# Patient Record
Sex: MALE | Race: WHITE | URBAN - METROPOLITAN AREA
[De-identification: names, ages, dates, MRNs, and addresses within clinical notes are randomized per-mention and may not be internally consistent; named-entity substitution may affect disease eponyms.]

---

## 2017-01-12 ENCOUNTER — OFFICE VISIT (OUTPATIENT)
Dept: FAMILY MEDICINE CLINIC | Age: 22
End: 2017-01-12

## 2017-01-12 VITALS
WEIGHT: 139 LBS | TEMPERATURE: 97.8 F | DIASTOLIC BLOOD PRESSURE: 78 MMHG | SYSTOLIC BLOOD PRESSURE: 128 MMHG | OXYGEN SATURATION: 97 % | HEART RATE: 89 BPM | HEIGHT: 66 IN | BODY MASS INDEX: 22.34 KG/M2 | RESPIRATION RATE: 12 BRPM

## 2017-01-12 DIAGNOSIS — R41.840 CONCENTRATION DEFICIT: Primary | ICD-10-CM

## 2017-01-12 RX ORDER — METHYLPHENIDATE HYDROCHLORIDE 36 MG/1
TABLET, EXTENDED RELEASE ORAL
Qty: 21 TAB | Refills: 0 | Status: SHIPPED | OUTPATIENT
Start: 2017-01-12 | End: 2017-02-02 | Stop reason: DRUGHIGH

## 2017-01-12 NOTE — PROGRESS NOTES
Chief Complaint   Patient presents with    Follow-up    Medication Evaluation     he is a 19y.o. year old male who presents for evalution. Pt switched to Adderall XR from Vyvanse at last visit. States doesn't feel like Adderall helps at all. Did seem like was slightly helpful the first 2 weeks, then felt like wasn't helpful at all. Pt had similar issues with Vyvanse previously. Pt works and goes to school, states days begin around [employer] and last until 9pm.    Reviewed PmHx, RxHx, FmHx, SocHx, AllgHx and updated and dated in the chart. Review of Systems - negative except as listed above in the HPI    Objective:     Vitals:    01/12/17 1613   BP: 128/78   Pulse: 89   Resp: 12   Temp: 97.8 °F (36.6 °C)   SpO2: 97%   Weight: 139 lb (63 kg)   Height: 5' 6\" (1.676 m)     Physical Examination: General appearance - alert, well appearing, and in no distress  Mental status - alert, oriented to person, place, and time, normal mood, behavior, speech, dress, motor activity, and thought processes  Neurological - alert, oriented, normal speech, no focal findings or movement disorder noted, motor and sensory grossly normal bilaterally    Assessment/ Plan:   Nina Norman was seen today for follow-up and medication evaluation. Diagnoses and all orders for this visit:    Concentration deficit  -     REFERRAL TO PSYCHOLOGY  -     CONCERTA 36 mg CR tablet; Take 1 po every day  New rx. Referred to psychology for ADD eval, wondering if may be some component of learning disability or other issue since not really responding well to ADD medication. F/U 3 wks. Pt voiced understanding regarding plan of care. Follow-up Disposition:  Return if symptoms worsen or fail to improve. I have discussed the diagnosis with the patient and the intended plan as seen in the above orders. The patient has received an after-visit summary and questions were answered concerning future plans.      Medication Side Effects and Warnings were discussed with patient    Emeka Vargas NP

## 2017-01-12 NOTE — PATIENT INSTRUCTIONS
Attention Deficit Hyperactivity Disorder (ADHD) in Adults: Care Instructions  Your Care Instructions  Attention deficit hyperactivity disorder, or ADHD, is a condition that makes it hard to pay attention. So you may have problems when you try to focus, get organized, and finish tasks. It might make you more active than other people. Or you might do things without thinking first.  ADHD is very common. It usually starts in early childhood. Many adults don't realize they have it until their children are diagnosed. Then they become aware of their own symptoms. Doctors don't know what causes ADHD. But it often runs in families. ADHD can be treated with medicines, behavior training, and counseling. Treatment can improve your life. Follow-up care is a key part of your treatment and safety. Be sure to make and go to all appointments, and call your doctor if you are having problems. It's also a good idea to know your test results and keep a list of the medicines you take. How can you care for yourself at home? · Learn all you can about ADHD. This will help you and your family understand it better. · Take your medicines exactly as prescribed. Call your doctor if you think you are having a problem with your medicine. You will get more details on the specific medicines your doctor prescribes. · If you miss a dose of your medicine, do not take an extra dose. · If your doctor suggests counseling, find a counselor you like and trust. Talk openly and honestly. Be willing to make some changes. · Find a support group for adults with ADHD. Talking to others with the same problems can help you feel better. It can also give you ideas about how to best cope with the condition. · Get rid of distractions at your work space. Keep your desk clean. Try not to face a window or busy hallway. · Use files, planners, and other tools to keep you organized. · Limit use of alcohol, and do not use illegal drugs.  People with ADHD tend to become addicted more easily than others. Tell your doctor if you need help to quit. Counseling, support groups, and sometimes medicines can help you stay free of alcohol or drugs. · Get at least 30 minutes of physical activity on most days of the week. Exercise has been shown to help people cope with ADHD. Walking is a good choice. You also may want to do other activities, such as running, swimming, cycling, or playing tennis or team sports. When should you call for help? Watch closely for changes in your health, and be sure to contact your doctor if:  · You feel sad a lot or cry all the time. · You have trouble sleeping, or you sleep too much. · You find it hard to concentrate, make decisions, or remember things. · You change how you normally eat. · You feel guilty for no reason. Where can you learn more? Go to http://mekhi-kady.info/. Enter B196 in the search box to learn more about \"Attention Deficit Hyperactivity Disorder (ADHD) in Adults: Care Instructions. \"  Current as of: July 26, 2016  Content Version: 11.1  © 6260-6183 WorkshopLive, Incorporated. Care instructions adapted under license by HealthyTweet (which disclaims liability or warranty for this information). If you have questions about a medical condition or this instruction, always ask your healthcare professional. [address] any warranty or liability for your use of this information.

## 2017-01-12 NOTE — MR AVS SNAPSHOT
Visit Information Date & Time Provider Department Dept. Phone Encounter #  
 1/12/2017  4:05 PM Jass Gamez NP [address] [facility] [phone] [id number] Follow-up Instructions Return if symptoms worsen or fail to improve. Upcoming Health Maintenance Date Due DTaP/Tdap/Td series (2 - Td) 12/12/2026 Allergies as of 1/12/2017  Review Complete On: 1/12/2017 By: Jass Gamez NP No Known Allergies Current Immunizations  Reviewed on 7/13/2016 No immunizations on file. Not reviewed this visit You Were Diagnosed With   
  
 Codes Comments Concentration deficit    -  Primary ICD-10-CM: R41.840 ICD-9-CM: 799.51 Vitals BP Pulse Temp Resp Height(growth percentile) Weight(growth percentile) 128/78 89 97.8 °F (36.6 °C) 12 5' 6\" (1.676 m) 139 lb (63 kg) SpO2 BMI Smoking Status 97% 22.44 kg/m2 Never Smoker Vitals History BMI and BSA Data Body Mass Index Body Surface Area  
 22.44 kg/m 2 1.71 m 2 Preferred Pharmacy Pharmacy Name Phone [address] [facility] [phone] Your Updated Medication List  
  
   
This list is accurate as of: 1/12/17  4:25 PM.  Always use your most recent med list.  
  
  
  
  
 CONCERTA 36 mg CR tablet Generic drug:  methylphenidate Take 1 po every day Prescriptions Printed Refills CONCERTA 36 mg CR tablet 0 Sig: Take 1 po every day Class: Print We Performed the Following REFERRAL TO PSYCHOLOGY [EFZ91 Custom] Comments:  
 Please evaluate patient for concentration deficit - not responding well to medications Follow-up Instructions Return if symptoms worsen or fail to improve. Referral Information Referral ID Referred By Referred To  
  
 [id number] Frankey Kinds E Not Available Visits Status Start Date End Date 1 New Request 1/12/17 1/12/18 If your referral has a status of pending review or denied, additional information will be sent to support the outcome of this decision. Patient Instructions Attention Deficit Hyperactivity Disorder (ADHD) in Adults: Care Instructions Your Care Instructions Attention deficit hyperactivity disorder, or ADHD, is a condition that makes it hard to pay attention. So you may have problems when you try to focus, get organized, and finish tasks. It might make you more active than other people. Or you might do things without thinking first. 
ADHD is very common. It usually starts in early childhood. Many adults don't realize they have it until their children are diagnosed. Then they become aware of their own symptoms. Doctors don't know what causes ADHD. But it often runs in families. ADHD can be treated with medicines, behavior training, and counseling. Treatment can improve your life. Follow-up care is a key part of your treatment and safety. Be sure to make and go to all appointments, and call your doctor if you are having problems. It's also a good idea to know your test results and keep a list of the medicines you take. How can you care for yourself at home? · Learn all you can about ADHD. This will help you and your family understand it better. · Take your medicines exactly as prescribed. Call your doctor if you think you are having a problem with your medicine. You will get more details on the specific medicines your doctor prescribes. · If you miss a dose of your medicine, do not take an extra dose. · If your doctor suggests counseling, find a counselor you like and trust. Talk openly and honestly. Be willing to make some changes. · Find a support group for adults with ADHD. Talking to others with the same problems can help you feel better. It can also give you ideas about how to best cope with the condition. · Get rid of distractions at your work space. Keep your desk clean. Try not to face a window or busy hallway. · Use files, planners, and other tools to keep you organized. · Limit use of alcohol, and do not use illegal drugs. People with ADHD tend to become addicted more easily than others. Tell your doctor if you need help to quit. Counseling, support groups, and sometimes medicines can help you stay free of alcohol or drugs. · Get at least 30 minutes of physical activity on most days of the week. Exercise has been shown to help people cope with ADHD. Walking is a good choice. You also may want to do other activities, such as running, swimming, cycling, or playing tennis or team sports. When should you call for help? Watch closely for changes in your health, and be sure to contact your doctor if: 
· You feel sad a lot or cry all the time. · You have trouble sleeping, or you sleep too much. · You find it hard to concentrate, make decisions, or remember things. · You change how you normally eat. · You feel guilty for no reason. Where can you learn more? Go to http://mekhi-kady.info/. Enter B196 in the search box to learn more about \"Attention Deficit Hyperactivity Disorder (ADHD) in Adults: Care Instructions. \" Current as of: July 26, 2016 Content Version: 11.1 © 0298-0185 StreamBase Systems, Incorporated. Care instructions adapted under license by SmartNews (which disclaims liability or warranty for this information). If you have questions about a medical condition or this instruction, always ask your healthcare professional. [place] [address] any warranty or liability for your use of this information. Introducing [facility] & HEALTH SERVICES! Dear Tye Beasley: Thank you for requesting a Aushon BioSystems account. Our records indicate that you already have an active [employer] account. You can access your account anytime at [email or web address] Did you know that you can access your hospital and ER discharge instructions at any time in Isowalk? You can also review all of your test results from your hospital stay or ER visit. Additional Information If you have questions, please visit the Frequently Asked Questions section of the Isowalk website at [email or web address] Remember, Isowalk is NOT to be used for urgent needs. For medical emergencies, dial 911. Now available from your iPhone and Android! Please provide this summary of care documentation to your next provider. Your primary care clinician is listed as GURPREET LANGE. If you have any questions after today's visit, please call [phone].

## 2017-02-02 ENCOUNTER — OFFICE VISIT (OUTPATIENT)
Dept: FAMILY MEDICINE CLINIC | Age: 22
End: 2017-02-02

## 2017-02-02 VITALS
OXYGEN SATURATION: 99 % | BODY MASS INDEX: 21.86 KG/M2 | TEMPERATURE: 99.2 F | WEIGHT: 136 LBS | HEART RATE: 72 BPM | DIASTOLIC BLOOD PRESSURE: 77 MMHG | HEIGHT: 66 IN | RESPIRATION RATE: 12 BRPM | SYSTOLIC BLOOD PRESSURE: 130 MMHG

## 2017-02-02 DIAGNOSIS — F98.8 ADD (ATTENTION DEFICIT DISORDER): Primary | ICD-10-CM

## 2017-02-02 RX ORDER — METHYLPHENIDATE HYDROCHLORIDE 54 MG/1
TABLET ORAL
Qty: 14 TAB | Refills: 0 | Status: SHIPPED | OUTPATIENT
Start: 2017-02-02 | End: 2017-02-16 | Stop reason: SDUPTHER

## 2017-02-02 NOTE — PATIENT INSTRUCTIONS
Attention Deficit Hyperactivity Disorder (ADHD) in Adults: Care Instructions  Your Care Instructions  Attention deficit hyperactivity disorder, or ADHD, is a condition that makes it hard to pay attention. So you may have problems when you try to focus, get organized, and finish tasks. It might make you more active than other people. Or you might do things without thinking first.  ADHD is very common. It usually starts in early childhood. Many adults don't realize they have it until their children are diagnosed. Then they become aware of their own symptoms. Doctors don't know what causes ADHD. But it often runs in families. ADHD can be treated with medicines, behavior training, and counseling. Treatment can improve your life. Follow-up care is a key part of your treatment and safety. Be sure to make and go to all appointments, and call your doctor if you are having problems. It's also a good idea to know your test results and keep a list of the medicines you take. How can you care for yourself at home? · Learn all you can about ADHD. This will help you and your family understand it better. · Take your medicines exactly as prescribed. Call your doctor if you think you are having a problem with your medicine. You will get more details on the specific medicines your doctor prescribes. · If you miss a dose of your medicine, do not take an extra dose. · If your doctor suggests counseling, find a counselor you like and trust. Talk openly and honestly. Be willing to make some changes. · Find a support group for adults with ADHD. Talking to others with the same problems can help you feel better. It can also give you ideas about how to best cope with the condition. · Get rid of distractions at your work space. Keep your desk clean. Try not to face a window or busy hallway. · Use files, planners, and other tools to keep you organized. · Limit use of alcohol, and do not use illegal drugs.  People with ADHD tend to become addicted more easily than others. Tell your doctor if you need help to quit. Counseling, support groups, and sometimes medicines can help you stay free of alcohol or drugs. · Get at least 30 minutes of physical activity on most days of the week. Exercise has been shown to help people cope with ADHD. Walking is a good choice. You also may want to do other activities, such as running, swimming, cycling, or playing tennis or team sports. When should you call for help? Watch closely for changes in your health, and be sure to contact your doctor if:  · You feel sad a lot or cry all the time. · You have trouble sleeping, or you sleep too much. · You find it hard to concentrate, make decisions, or remember things. · You change how you normally eat. · You feel guilty for no reason. Where can you learn more? Go to http://mekhi-kady.info/. Enter B196 in the search box to learn more about \"Attention Deficit Hyperactivity Disorder (ADHD) in Adults: Care Instructions. \"  Current as of: July 26, 2016  Content Version: 11.1  © 8483-1782 Vidyo, Incorporated. Care instructions adapted under license by Canva (which disclaims liability or warranty for this information). If you have questions about a medical condition or this instruction, always ask your healthcare professional. [address] any warranty or liability for your use of this information.

## 2017-02-02 NOTE — PROGRESS NOTES
Chief Complaint   Patient presents with    Medication Evaluation     he is a 19y.o. year old male who presents for evalution. Pt states feels improvement on Concerta, much better than Adderall XR. Pt states most days seems to work well, some days isn't as effective. Pt states he feels good at current dose, wife states feels like medication could be increased or changed. Reviewed PmHx, RxHx, FmHx, SocHx, AllgHx and updated and dated in the chart. Review of Systems - negative except as listed above in the HPI    Objective:     Vitals:    02/02/17 1626   BP: 130/77   Pulse: 72   Resp: 12   Temp: 99.2 °F (37.3 °C)   SpO2: 99%   Weight: 136 lb (61.7 kg)   Height: 5' 6\" (1.676 m)     Physical Examination: General appearance - alert, well appearing, and in no distress  Mental status - alert, oriented to person, place, and time, normal mood, behavior, speech, dress, motor activity, and thought processes  Chest - clear to auscultation, no wheezes, rales or rhonchi, symmetric air entry  Heart - normal rate, regular rhythm, normal S1, S2, no murmurs, rubs, clicks or gallops    Assessment/ Plan:   Isaiah Fuller was seen today for medication evaluation. Diagnoses and all orders for this visit:    ADD (attention deficit disorder)  -     methylphenidate ER 54 mg 24 hr tab; Take 1 po qam  Increased dose, pt only wants 2 wk supply to try. F/U 2 wks for recheck. Reviewed , pt taking appropriately. Pt voiced understanding regarding plan of care. Follow-up Disposition:  Return in about 2 weeks (around 2/16/2017) for ADD eval .    I have discussed the diagnosis with the patient and the intended plan as seen in the above orders. The patient has received an after-visit summary and questions were answered concerning future plans.      Medication Side Effects and Warnings were discussed with patient    Sundar Peters NP

## 2017-02-02 NOTE — MR AVS SNAPSHOT
Visit Information Date & Time Provider Department Dept. Phone Encounter #  
 2/2/2017  4:05 PM Michelle Pressley NP [address] [facility] [phone] [id number] Follow-up Instructions Return in about 2 weeks (around 2/16/2017) for ADD eval . Upcoming Health Maintenance Date Due DTaP/Tdap/Td series (2 - Td) 12/12/2026 Allergies as of 2/2/2017  Review Complete On: 2/2/2017 By: Michelle Pressley NP No Known Allergies Current Immunizations  Reviewed on 7/13/2016 No immunizations on file. Not reviewed this visit You Were Diagnosed With   
  
 Codes Comments ADD (attention deficit disorder)    -  Primary ICD-10-CM: F98.8 ICD-9-CM: 314.00 Vitals BP Pulse Temp Resp Height(growth percentile) Weight(growth percentile) 130/77 72 99.2 °F (37.3 °C) 12 5' 6\" (1.676 m) 136 lb (61.7 kg) SpO2 BMI Smoking Status 99% 21.95 kg/m2 Never Smoker Vitals History BMI and BSA Data Body Mass Index Body Surface Area  
 21.95 kg/m 2 1.7 m 2 Preferred Pharmacy Pharmacy Name Phone [address] [facility] [phone] Your Updated Medication List  
  
   
This list is accurate as of: 2/2/17  4:35 PM.  Always use your most recent med list.  
  
  
  
  
 methylphenidate 54 mg CR tablet Commonly known as:  CONCERTA Take 1 po qam  
  
  
  
  
Prescriptions Printed Refills  
 methylphenidate ER 54 mg 24 hr tab 0 Sig: Take 1 po qam  
 Class: Print Follow-up Instructions Return in about 2 weeks (around 2/16/2017) for ADD eval . Patient Instructions Attention Deficit Hyperactivity Disorder (ADHD) in Adults: Care Instructions Your Care Instructions Attention deficit hyperactivity disorder, or ADHD, is a condition that makes it hard to pay attention.  So you may have problems when you try to focus, get organized, and finish tasks. It might make you more active than other people. Or you might do things without thinking first. 
ADHD is very common. It usually starts in early childhood. Many adults don't realize they have it until their children are diagnosed. Then they become aware of their own symptoms. Doctors don't know what causes ADHD. But it often runs in families. ADHD can be treated with medicines, behavior training, and counseling. Treatment can improve your life. Follow-up care is a key part of your treatment and safety. Be sure to make and go to all appointments, and call your doctor if you are having problems. It's also a good idea to know your test results and keep a list of the medicines you take. How can you care for yourself at home? · Learn all you can about ADHD. This will help you and your family understand it better. · Take your medicines exactly as prescribed. Call your doctor if you think you are having a problem with your medicine. You will get more details on the specific medicines your doctor prescribes. · If you miss a dose of your medicine, do not take an extra dose. · If your doctor suggests counseling, find a counselor you like and trust. Talk openly and honestly. Be willing to make some changes. · Find a support group for adults with ADHD. Talking to others with the same problems can help you feel better. It can also give you ideas about how to best cope with the condition. · Get rid of distractions at your work space. Keep your desk clean. Try not to face a window or busy hallway. · Use files, planners, and other tools to keep you organized. · Limit use of alcohol, and do not use illegal drugs. People with ADHD tend to become addicted more easily than others. Tell your doctor if you need help to quit. Counseling, support groups, and sometimes medicines can help you stay free of alcohol or drugs. · Get at least 30 minutes of physical activity on most days of the week. Exercise has been shown to help people cope with ADHD. Walking is a good choice. You also may want to do other activities, such as running, swimming, cycling, or playing tennis or team sports. When should you call for help? Watch closely for changes in your health, and be sure to contact your doctor if: 
· You feel sad a lot or cry all the time. · You have trouble sleeping, or you sleep too much. · You find it hard to concentrate, make decisions, or remember things. · You change how you normally eat. · You feel guilty for no reason. Where can you learn more? Go to http://mekhi-kady.info/. Enter B196 in the search box to learn more about \"Attention Deficit Hyperactivity Disorder (ADHD) in Adults: Care Instructions. \" Current as of: July 26, 2016 Content Version: 11.1 © 7711-4972 Studer Group. Care instructions adapted under license by Bon'App (which disclaims liability or warranty for this information). If you have questions about a medical condition or this instruction, always ask your healthcare professional. [place] [address] any warranty or liability for your use of this information. Introducing [facility] & HEALTH SERVICES! Dear Yann Poon: Thank you for requesting a SMITH (formerly Ascentium) account. Our records indicate that you already have an active SMITH (formerly Ascentium) account. You can access your account anytime at [email or web address] Shot [email or web address] Shop/Flint and Tinder Did you know that you can access your hospital and ER discharge instructions at any time in SMITH (formerly Ascentium)? You can also review all of your test results from your hospital stay or ER visit. Additional Information If you have questions, please visit the Frequently Asked Questions section of the SMITH (formerly Ascentium) website at [email or web address] Shot [email or web address] Shop/[email or web address] Tinder/. Remember, SMITH (formerly Ascent[employer]) is NOT to be used for urgent needs. For medical emergencies, dial 911. Now available from your iPhone and Android! Please provide this summary of care documentation to your next provider. Your primary care clinician is listed as GURPREET LANGE. If you have any questions after today's visit, please call [phone].

## 2017-02-16 ENCOUNTER — OFFICE VISIT (OUTPATIENT)
Dept: FAMILY MEDICINE CLINIC | Age: 22
End: 2017-02-16

## 2017-02-16 VITALS
HEIGHT: 66 IN | RESPIRATION RATE: 16 BRPM | BODY MASS INDEX: 22.1 KG/M2 | TEMPERATURE: 98.6 F | WEIGHT: 137.5 LBS | OXYGEN SATURATION: 96 % | DIASTOLIC BLOOD PRESSURE: 70 MMHG | SYSTOLIC BLOOD PRESSURE: 120 MMHG | HEART RATE: 76 BPM

## 2017-02-16 DIAGNOSIS — F98.8 ADD (ATTENTION DEFICIT DISORDER): Primary | ICD-10-CM

## 2017-02-16 RX ORDER — METHYLPHENIDATE HYDROCHLORIDE 54 MG/1
TABLET ORAL
Qty: 30 TAB | Refills: 0 | Status: SHIPPED | OUTPATIENT
Start: 2017-02-16 | End: 2017-03-24 | Stop reason: SDUPTHER

## 2017-02-16 NOTE — PROGRESS NOTES
1. Have you been to the ER, urgent care clinic since your last visit? Hospitalized since your last visit? No    2. Have you seen or consulted any other health care providers outside of the [address] [place] [address] since your last visit? Include any pap smears or colon screening.  No    Chief Complaint   Patient presents with    Attention Deficit Disorder     evaluation

## 2017-02-16 NOTE — PROGRESS NOTES
Chief Complaint   Patient presents with    Attention Deficit Disorder     evaluation     he is a 19y.o. year old male who presents for evalution. Pt states new dose of medication is working well. Feels well, like things are improving but isn't having the issue of it being dramatic increase and then stop working. Wife has also noticed improvement, still has some moments of occasional issues but mostly improving. Reviewed PmHx, RxHx, FmHx, SocHx, AllgHx and updated and dated in the chart. Review of Systems - negative except as listed above in the HPI    Objective:     Vitals:    02/16/17 1612   BP: 120/70   Pulse: 76   Resp: 16   Temp: 98.6 °F (37 °C)   TempSrc: Oral   SpO2: 96%   Weight: 137 lb 8 oz (62.4 kg)   Height: 5' 6\" (1.676 m)     Physical Examination: General appearance - alert, well appearing, and in no distress  Mental status - alert, oriented to person, place, and time, normal mood, behavior, speech, dress, motor activity, and thought processes  Neurological - alert, oriented, normal speech, no focal findings or movement disorder noted    Assessment/ Plan:   Edvin Lawson was seen today for attention deficit disorder. Diagnoses and all orders for this visit:    ADD (attention deficit disorder)  -     methylphenidate ER 54 mg 24 hr tab; Take 1 po qam  Reviewed , pt taking appropriately. Refill provided. F/U 1 mo and if feeling well then will give 3 mo supply. Pt voiced understanding regarding plan of care. Follow-up Disposition:  Return if symptoms worsen or fail to improve. I have discussed the diagnosis with the patient and the intended plan as seen in the above orders. The patient has received an after-visit summary and questions were answered concerning future plans.      Medication Side Effects and Warnings were discussed with patient    Juan Glover, MAXIMINO

## 2017-02-16 NOTE — MR AVS SNAPSHOT
Visit Information Date & Time Provider Department Dept. Phone Encounter #  
 2/16/2017  4:05 PM Rafa Sampson NP [address] [facility] [phone] [id number] Follow-up Instructions Return if symptoms worsen or fail to improve. Upcoming Health Maintenance Date Due DTaP/Tdap/Td series (2 - Td) 12/12/2026 Allergies as of 2/16/2017  Review Complete On: 2/16/2017 By: Rafa Sampson NP No Known Allergies Current Immunizations  Reviewed on 7/13/2016 No immunizations on file. Not reviewed this visit You Were Diagnosed With   
  
 Codes Comments ADD (attention deficit disorder)    -  Primary ICD-10-CM: F98.8 ICD-9-CM: 314.00 Vitals BP Pulse Temp Resp Height(growth percentile) Weight(growth percentile) 120/70 76 98.6 °F (37 °C) (Oral) 16 5' 6\" (1.676 m) 137 lb 8 oz (62.4 kg) SpO2 BMI Smoking Status 96% 22.19 kg/m2 Never Smoker Vitals History BMI and BSA Data Body Mass Index Body Surface Area  
 22.19 kg/m 2 1.7 m 2 Preferred Pharmacy Pharmacy Name Phone [address] [facility] OF  [facility] [phone] Your Updated Medication List  
  
   
This list is accurate as of: 2/16/17  4:25 PM.  Always use your most recent med list.  
  
  
  
  
 methylphenidate 54 mg CR tablet Commonly known as:  CONCERTA Take 1 po qam  
  
  
  
  
Prescriptions Printed Refills  
 methylphenidate ER 54 mg 24 hr tab 0 Sig: Take 1 po qam  
 Class: Print Follow-up Instructions Return if symptoms worsen or fail to improve. Patient Instructions Attention Deficit Hyperactivity Disorder (ADHD) in Adults: Care Instructions Your Care Instructions Attention deficit hyperactivity disorder, or ADHD, is a condition that makes it hard to pay attention.  So you may have problems when you try to focus, get organized, and finish tasks. It might make you more active than other people. Or you might do things without thinking first. 
ADHD is very common. It usually starts in early childhood. Many adults don't realize they have it until their children are diagnosed. Then they become aware of their own symptoms. Doctors don't know what causes ADHD. But it often runs in families. ADHD can be treated with medicines, behavior training, and counseling. Treatment can improve your life. Follow-up care is a key part of your treatment and safety. Be sure to make and go to all appointments, and call your doctor if you are having problems. It's also a good idea to know your test results and keep a list of the medicines you take. How can you care for yourself at home? · Learn all you can about ADHD. This will help you and your family understand it better. · Take your medicines exactly as prescribed. Call your doctor if you think you are having a problem with your medicine. You will get more details on the specific medicines your doctor prescribes. · If you miss a dose of your medicine, do not take an extra dose. · If your doctor suggests counseling, find a counselor you like and trust. Talk openly and honestly. Be willing to make some changes. · Find a support group for adults with ADHD. Talking to others with the same problems can help you feel better. It can also give you ideas about how to best cope with the condition. · Get rid of distractions at your work space. Keep your desk clean. Try not to face a window or busy hallway. · Use files, planners, and other tools to keep you organized. · Limit use of alcohol, and do not use illegal drugs. People with ADHD tend to become addicted more easily than others. Tell your doctor if you need help to quit. Counseling, support groups, and sometimes medicines can help you stay free of alcohol or drugs. · Get at least 30 minutes of physical activity on most days of the week. Exercise has been shown to help people cope with ADHD. Walking is a good choice. You also may want to do other activities, such as running, swimming, cycling, or playing tennis or team sports. When should you call for help? Watch closely for changes in your health, and be sure to contact your doctor if: 
· You feel sad a lot or cry all the time. · You have trouble sleeping, or you sleep too much. · You find it hard to concentrate, make decisions, or remember things. · You change how you normally eat. · You feel guilty for no reason. Where can you learn more? Go to http://mekhi-kady.info/. Enter B196 in the search box to learn more about \"Attention Deficit Hyperactivity Disorder (ADHD) in Adults: Care Instructions. \" Current as of: July 26, 2016 Content Version: 11.1 © 0260-5087 [employer]. Care instructions adapted under license by [employer] (which disclaims liability or warranty for this information). If you have questions about a medical condition or this instruction, always ask your healthcare professional. [place] [address] any warranty or liability for your use of this information. Introducing [facility] & HEALTH SERVICES! Dear Geeta Gutiérrez: Thank you for requesting a tadoÂ° account. Our records indicate that you already have an active tadoÂ° account. You can access your account anytime at [email or web address] Did you know that you can access your hospital and ER discharge instructions at any time in tadoÂ°? You can also review all of your test results from your hospital stay or ER visit. Additional Information If you have questions, please visit the Frequently Asked Questions section of the tadoÂ° website at [email or web address] Remember, tadoÂ° is NOT to be used for urgent needs. For medical emergencies, dial 911. Now available from your iPhone and Android! Please provide this summary of care documentation to your next provider. Your primary care clinician is listed as GURPREET LANGE. If you have any questions after today's visit, please call [phone].

## 2017-03-24 ENCOUNTER — OFFICE VISIT (OUTPATIENT)
Dept: FAMILY MEDICINE CLINIC | Age: 22
End: 2017-03-24

## 2017-03-24 VITALS
RESPIRATION RATE: 16 BRPM | HEART RATE: 75 BPM | DIASTOLIC BLOOD PRESSURE: 76 MMHG | TEMPERATURE: 98.1 F | WEIGHT: 142.6 LBS | HEIGHT: 66 IN | SYSTOLIC BLOOD PRESSURE: 124 MMHG | BODY MASS INDEX: 22.92 KG/M2 | OXYGEN SATURATION: 98 %

## 2017-03-24 DIAGNOSIS — F98.8 ADD (ATTENTION DEFICIT DISORDER): ICD-10-CM

## 2017-03-24 RX ORDER — METHYLPHENIDATE HYDROCHLORIDE 54 MG/1
54 TABLET ORAL DAILY
Qty: 30 TAB | Refills: 0 | Status: SHIPPED | OUTPATIENT
Start: 2017-04-23 | End: 2017-06-28 | Stop reason: SDUPTHER

## 2017-03-24 RX ORDER — METHYLPHENIDATE HYDROCHLORIDE 36 MG/1
TABLET, EXTENDED RELEASE ORAL
Refills: 0 | COMMUNITY
Start: 2017-01-12 | End: 2017-03-24

## 2017-03-24 RX ORDER — METHYLPHENIDATE HYDROCHLORIDE 54 MG/1
54 TABLET ORAL
Qty: 30 TAB | Refills: 0 | Status: SHIPPED | OUTPATIENT
Start: 2017-05-23 | End: 2017-06-28 | Stop reason: SDUPTHER

## 2017-03-24 RX ORDER — METHYLPHENIDATE HYDROCHLORIDE 54 MG/1
TABLET ORAL
Qty: 30 TAB | Refills: 0 | Status: SHIPPED | OUTPATIENT
Start: 2017-03-24 | End: 2017-06-28 | Stop reason: SDUPTHER

## 2017-03-24 NOTE — PROGRESS NOTES
Chief Complaint   Patient presents with    Medication Refill     Concerta     he is a 19y.o. year old male who presents for evalution. Here for ADD F/U. Doing well on current medication and dosage!!!! No problems with lack of appetite or insomnia. Reviewed PmHx, RxHx, FmHx, SocHx, AllgHx and updated and dated in the chart. Review of Systems - negative except as listed above in the HPI    Objective:     Vitals:    03/24/17 1314   BP: 124/76   Pulse: 75   Resp: 16   Temp: 98.1 °F (36.7 °C)   TempSrc: Oral   SpO2: 98%   Weight: 142 lb 9.6 oz (64.7 kg)   Height: 5' 6\" (1.676 m)     Physical Examination: General appearance - alert, well appearing, and in no distress  Mental status - alert, oriented to person, place, and time, normal mood, behavior, speech, dress, motor activity, and thought processes  Chest - clear to auscultation, no wheezes, rales or rhonchi, symmetric air entry  Heart - normal rate, regular rhythm, normal S1, S2, no murmurs, rubs, clicks or gallops    Assessment/ Plan:   Edvin Lawson was seen today for medication refill. Diagnoses and all orders for this visit:    ADD (attention deficit disorder)  -     methylphenidate ER 54 mg 24 hr tab; Take 1 po qam  -     methylphenidate ER 54 mg 24 hr tab; Take 1 Tab (54 mg total) by mouth every morningEarliest Fill Date: 5/23/17. Max Daily Amount: 54 mg  -     methylphenidate ER 54 mg 24 hr tab; Take 1 Tab (54 mg total) by mouth dailyEarliest Fill Date: 4/23/17. Max Daily Amount: 54 mg   Refills provided. Reviewed , pt taking appropriately. F/U 3mo. Pt voiced understanding regarding plan of care. Follow-up Disposition:  Return in about 3 months (around 6/24/2017) for ADD meds . I have discussed the diagnosis with the patient and the intended plan as seen in the above orders. The patient has received an after-visit summary and questions were answered concerning future plans.      Medication Side Effects and Warnings were discussed with patient    Rafa Sampson NP

## 2017-03-24 NOTE — PATIENT INSTRUCTIONS
Attention Deficit Hyperactivity Disorder (ADHD) in Adults: Care Instructions  Your Care Instructions  Attention deficit hyperactivity disorder, or ADHD, is a condition that makes it hard to pay attention. So you may have problems when you try to focus, get organized, and finish tasks. It might make you more active than other people. Or you might do things without thinking first.  ADHD is very common. It usually starts in early childhood. Many adults don't realize they have it until their children are diagnosed. Then they become aware of their own symptoms. Doctors don't know what causes ADHD. But it often runs in families. ADHD can be treated with medicines, behavior training, and counseling. Treatment can improve your life. Follow-up care is a key part of your treatment and safety. Be sure to make and go to all appointments, and call your doctor if you are having problems. It's also a good idea to know your test results and keep a list of the medicines you take. How can you care for yourself at home? · Learn all you can about ADHD. This will help you and your family understand it better. · Take your medicines exactly as prescribed. Call your doctor if you think you are having a problem with your medicine. You will get more details on the specific medicines your doctor prescribes. · If you miss a dose of your medicine, do not take an extra dose. · If your doctor suggests counseling, find a counselor you like and trust. Talk openly and honestly. Be willing to make some changes. · Find a support group for adults with ADHD. Talking to others with the same problems can help you feel better. It can also give you ideas about how to best cope with the condition. · Get rid of distractions at your work space. Keep your desk clean. Try not to face a window or busy hallway. · Use files, planners, and other tools to keep you organized. · Limit use of alcohol, and do not use illegal drugs.  People with ADHD tend to become addicted more easily than others. Tell your doctor if you need help to quit. Counseling, support groups, and sometimes medicines can help you stay free of alcohol or drugs. · Get at least 30 minutes of physical activity on most days of the week. Exercise has been shown to help people cope with ADHD. Walking is a good choice. You also may want to do other activities, such as running, swimming, cycling, or playing tennis or team sports. When should you call for help? Watch closely for changes in your health, and be sure to contact your doctor if:  · You feel sad a lot or cry all the time. · You have trouble sleeping, or you sleep too much. · You find it hard to concentrate, make decisions, or remember things. · You change how you normally eat. · You feel guilty for no reason. Where can you learn more? Go to http://mekhi-kady.info/. Enter B196 in the search box to learn more about \"Attention Deficit Hyperactivity Disorder (ADHD) in Adults: Care Instructions. \"  Current as of: July 26, 2016  Content Version: 11.1  © 9763-2526 [employer] [address], [employer]. Care instructions adapted under license by [facility] (which disclaims liability or warranty for this information). If you have questions about a medical condition or this instruction, always ask your healthcare professional. [address] any warranty or liability for your use of this information.

## 2017-03-24 NOTE — MR AVS SNAPSHOT
Visit Information Date & Time Provider Department Dept. Phone Encounter #  
 3/24/2017  1:15 PM Neo Duvalpe, NP [address] [facility] [phone] [id number] Follow-up Instructions Return in about 3 months (around 6/24/2017) for ADD meds . Upcoming Health Maintenance Date Due DTaP/Tdap/Td series (2 - Td) 12/12/2026 Allergies as of 3/24/2017  Review Complete On: 3/24/2017 By: Darien Orona No Known Allergies Current Immunizations  Reviewed on 7/13/2016 No immunizations on file. Not reviewed this visit You Were Diagnosed With   
  
 Codes Comments ADD (attention deficit disorder)     ICD-10-CM: F98.8 ICD-9-CM: 314.00 Vitals BP Pulse Temp Resp Height(growth percentile) Weight(growth percentile) 124/76 (BP 1 Location: Left arm, BP Patient Position: Sitting) 75 98.1 °F (36.7 °C) (Oral) 16 5' 6\" (1.676 m) 142 lb 9.6 oz (64.7 kg) SpO2 BMI Smoking Status 98% 23.02 kg/m2 Never Smoker Vitals History BMI and BSA Data Body Mass Index Body Surface Area 23.02 kg/m 2 1.74 m 2 Preferred Pharmacy Pharmacy Name Phone [address] [facility] [phone] Your Updated Medication List  
  
   
This list is accurate as of: 3/24/17  1:22 PM.  Always use your most recent med list.  
  
  
  
  
 * methylphenidate 54 mg CR tablet Commonly known as:  CONCERTA Take 1 po qam  
  
 * methylphenidate 54 mg CR tablet Commonly known as:  CONCERTA Take 1 Tab (54 mg total) by mouth dailyEarliest Fill Date: 4/23/17. Max Daily Amount: 54 mg Start taking on:  4/23/2017 * methylphenidate 54 mg CR tablet Commonly known as:  CONCERTA Take 1 Tab (54 mg total) by mouth every morningEarliest Fill Date: 5/23/17. Max Daily Amount: 54 mg Start taking on:  5/23/2017 * Notice: This list has 3 medication(s) that are the same as other medications prescribed for you. Read the directions carefully, and ask your doctor or other care provider to review them with you. Prescriptions Printed Refills  
 methylphenidate ER 54 mg 24 hr tab 0 Sig: Take 1 po qam  
 Class: Print  
 methylphenidate ER 54 mg 24 hr tab 0 Starting on: 5/23/2017 Sig: Take 1 Tab (54 mg total) by mouth every morningEarliest Fill Date: 5/23/17. Max Daily Amount: 54 mg Class: Print Route: Oral  
 methylphenidate ER 54 mg 24 hr tab 0 Starting on: 4/23/2017 Sig: Take 1 Tab (54 mg total) by mouth dailyEarliest Fill Date: 4/23/17. Max Daily Amount: 54 mg Class: Print Route: Oral  
  
Follow-up Instructions Return in about 3 months (around 6/24/2017) for ADD meds . Patient Instructions Attention Deficit Hyperactivity Disorder (ADHD) in Adults: Care Instructions Your Care Instructions Attention deficit hyperactivity disorder, or ADHD, is a condition that makes it hard to pay attention. So you may have problems when you try to focus, get organized, and finish tasks. It might make you more active than other people. Or you might do things without thinking first. 
ADHD is very common. It usually starts in early childhood. Many adults don't realize they have it until their children are diagnosed. Then they become aware of their own symptoms. Doctors don't know what causes ADHD. But it often runs in families. ADHD can be treated with medicines, behavior training, and counseling. Treatment can improve your life. Follow-up care is a key part of your treatment and safety. Be sure to make and go to all appointments, and call your doctor if you are having problems. It's also a good idea to know your test results and keep a list of the medicines you take. How can you care for yourself at home? · Learn all you can about ADHD.  This will help you and your family understand it better. · Take your medicines exactly as prescribed. Call your doctor if you think you are having a problem with your medicine. You will get more details on the specific medicines your doctor prescribes. · If you miss a dose of your medicine, do not take an extra dose. · If your doctor suggests counseling, find a counselor you like and trust. Talk openly and honestly. Be willing to make some changes. · Find a support group for adults with ADHD. Talking to others with the same problems can help you feel better. It can also give you ideas about how to best cope with the condition. · Get rid of distractions at your work space. Keep your desk clean. Try not to face a window or busy hallway. · Use files, planners, and other tools to keep you organized. · Limit use of alcohol, and do not use illegal drugs. People with ADHD tend to become addicted more easily than others. Tell your doctor if you need help to quit. Counseling, support groups, and sometimes medicines can help you stay free of alcohol or drugs. · Get at least 30 minutes of physical activity on most days of the week. Exercise has been shown to help people cope with ADHD. Walking is a good choice. You also may want to do other activities, such as running, swimming, cycling, or playing tennis or team sports. When should you call for help? Watch closely for changes in your health, and be sure to contact your doctor if: 
· You feel sad a lot or cry all the time. · You have trouble sleeping, or you sleep too much. · You find it hard to concentrate, make decisions, or remember things. · You change how you normally eat. · You feel guilty for no reason. Where can you learn more? Go to http://mekhi-kady.info/. Enter B196 in the search box to learn more about \"Attention Deficit Hyperactivity Disorder (ADHD) in Adults: Care Instructions. \" Current as of: July 26, 2016 Content Version: 11.1 © 7300-7225 Healthwise, Incorporated. Care instructions adapted under license by [employer] (which disclaims liability or warranty for this information). If you have questions about a medical condition or this instruction, always ask your healthcare professional. [address] any warranty or liability for your use of this information. Introducing [facility] & HEALTH SERVICES! Dear Tye Beasley: Thank you for requesting a [employer] account. Our records indicate that you already have an active [employer] account. You can access your account anytime at [email or web address] Did you know that you can access your hospital and ER discharge instructions at any time in [employer]? You can also review all of your test results from your hospital stay or ER visit. Additional Information If you have questions, please visit the Frequently Asked Questions section of the [employer] website at [email or web address] Remember, [employer] is NOT to be used for urgent needs. For medical emergencies, dial 911. Now available from your iPhone and Android! Please provide this summary of care documentation to your next provider. Your primary care clinician is listed as GURPREET LANGE. If you have any questions after today's visit, please call [phone].

## 2017-06-28 ENCOUNTER — OFFICE VISIT (OUTPATIENT)
Dept: FAMILY MEDICINE CLINIC | Age: 22
End: 2017-06-28

## 2017-06-28 VITALS
WEIGHT: 144 LBS | BODY MASS INDEX: 23.14 KG/M2 | RESPIRATION RATE: 16 BRPM | SYSTOLIC BLOOD PRESSURE: 112 MMHG | TEMPERATURE: 98.4 F | DIASTOLIC BLOOD PRESSURE: 76 MMHG | HEIGHT: 66 IN | OXYGEN SATURATION: 99 % | HEART RATE: 64 BPM

## 2017-06-28 DIAGNOSIS — F98.8 ADD (ATTENTION DEFICIT DISORDER): ICD-10-CM

## 2017-06-28 RX ORDER — METHYLPHENIDATE HYDROCHLORIDE 54 MG/1
54 TABLET ORAL
Qty: 30 TAB | Refills: 0 | Status: SHIPPED | OUTPATIENT
Start: 2017-07-28 | End: 2017-10-19 | Stop reason: SDUPTHER

## 2017-06-28 RX ORDER — METHYLPHENIDATE HYDROCHLORIDE 54 MG/1
54 TABLET ORAL DAILY
Qty: 30 TAB | Refills: 0 | Status: SHIPPED | OUTPATIENT
Start: 2017-06-28 | End: 2017-10-19 | Stop reason: SDUPTHER

## 2017-06-28 RX ORDER — METHYLPHENIDATE HYDROCHLORIDE 54 MG/1
TABLET ORAL
Qty: 30 TAB | Refills: 0 | Status: SHIPPED | OUTPATIENT
Start: 2017-08-27 | End: 2017-10-19 | Stop reason: SDUPTHER

## 2017-06-28 NOTE — PATIENT INSTRUCTIONS
Attention Deficit Hyperactivity Disorder (ADHD) in Adults: Care Instructions  Your Care Instructions  Attention deficit hyperactivity disorder, or ADHD, is a condition that makes it hard to pay attention. So you may have problems when you try to focus, get organized, and finish tasks. It might make you more active than other people. Or you might do things without thinking first.  ADHD is very common. It usually starts in early childhood. Many adults don't realize they have it until their children are diagnosed. Then they become aware of their own symptoms. Doctors don't know what causes ADHD. But it often runs in families. ADHD can be treated with medicines, behavior training, and counseling. Treatment can improve your life. Follow-up care is a key part of your treatment and safety. Be sure to make and go to all appointments, and call your doctor if you are having problems. It's also a good idea to know your test results and keep a list of the medicines you take. How can you care for yourself at home? · Learn all you can about ADHD. This will help you and your family understand it better. · Take your medicines exactly as prescribed. Call your doctor if you think you are having a problem with your medicine. You will get more details on the specific medicines your doctor prescribes. · If you miss a dose of your medicine, do not take an extra dose. · If your doctor suggests counseling, find a counselor you like and trust. Talk openly and honestly. Be willing to make some changes. · Find a support group for adults with ADHD. Talking to others with the same problems can help you feel better. It can also give you ideas about how to best cope with the condition. · Get rid of distractions at your work space. Keep your desk clean. Try not to face a window or busy hallway. · Use files, planners, and other tools to keep you organized. · Limit use of alcohol, and do not use illegal drugs.  People with ADHD tend to become addicted more easily than others. Tell your doctor if you need help to quit. Counseling, support groups, and sometimes medicines can help you stay free of alcohol or drugs. · Get at least 30 minutes of physical activity on most days of the week. Exercise has been shown to help people cope with ADHD. Walking is a good choice. You also may want to do other activities, such as running, swimming, cycling, or playing tennis or team sports. When should you call for help? Watch closely for changes in your health, and be sure to contact your doctor if:  · You feel sad a lot or cry all the time. · You have trouble sleeping, or you sleep too much. · You find it hard to concentrate, make decisions, or remember things. · You change how you normally eat. · You feel guilty for no reason. Where can you learn more? Go to http://mekhi-kady.info/. Enter B196 in the search box to learn more about \"Attention Deficit Hyperactivity Disorder (ADHD) in Adults: Care Instructions. \"  Current as of: July 26, 2016  Content Version: 11.3  © 9851-9058 [employer], Incorporated. Care instructions adapted under license by [employer] (which disclaims liability or warranty for this information). If you have questions about a medical condition or this instruction, always ask your healthcare professional. [address] any warranty or liability for your use of this information.

## 2017-06-28 NOTE — PROGRESS NOTES
Chief Complaint   Patient presents with    Medication Refill     ADD     he is a 19y.o. year old male who presents for evalution. Doing well on current dosage of medication. No problems with lack of appetite or insomnia, no palpitations. Reviewed PmHx, RxHx, FmHx, SocHx, AllgHx and updated and dated in the chart. Review of Systems - negative except as listed above in the HPI    Objective:     Vitals:    06/28/17 1617   BP: 112/76   Pulse: 64   Resp: 16   Temp: 98.4 °F (36.9 °C)   TempSrc: Oral   SpO2: 99%   Weight: 144 lb (65.3 kg)   Height: 5' 6\" (1.676 m)     Physical Examination: General appearance - alert, well appearing, and in no distress  Mental status - alert, oriented to person, place, and time, normal mood, behavior, speech, dress, motor activity, and thought processes  Chest - clear to auscultation, no wheezes, rales or rhonchi, symmetric air entry  Heart - normal rate, regular rhythm, normal S1, S2, no murmurs, rubs, clicks or gallops    Assessment/ Plan:   Tra Betancur was seen today for medication refill. Diagnoses and all orders for this visit:    ADD (attention deficit disorder)  -     methylphenidate ER 54 mg 24 hr tab; Earliest Fill Date: 8/27/17. Take 1 po qam  -     methylphenidate ER 54 mg 24 hr tab; Take 1 Tab (54 mg total) by mouth every morningEarliest Fill Date: 7/28/17. Max Daily Amount: 54 mg  -     methylphenidate ER 54 mg 24 hr tab; Take 1 Tab (54 mg total) by mouth daily. Max Daily Amount: 54 mg  Stable. Reviewed , pt taking appropriately. FU 3 mo. Pt voiced understanding regarding plan of care. Follow-up Disposition:  Return in about 3 months (around 9/28/2017) for ADD meds. I have discussed the diagnosis with the patient and the intended plan as seen in the above orders. The patient has received an after-visit summary and questions were answered concerning future plans.      Medication Side Effects and Warnings were discussed with patient    Divya Lundborg. Edyta Stauffer, NP

## 2017-06-28 NOTE — PROGRESS NOTES
1. Have you been to the ER, urgent care clinic since your last visit? Hospitalized since your last visit? No    2. Have you seen or consulted any other health care providers outside of the [address] [place] [address] since your last visit? Include any pap smears or colon screening.  No     Chief Complaint   Patient presents with    Medication Refill     ADD

## 2017-06-28 NOTE — MR AVS SNAPSHOT
Visit Information Date & Time Provider Department Dept. Phone Encounter #  
 6/28/2017  4:05 PM Kan Booker NP [address] [facility] [phone] [id number] Follow-up Instructions Return in about 3 months (around 9/28/2017) for ADD meds. Upcoming Health Maintenance Date Due INFLUENZA AGE 9 TO ADULT 8/1/2017 DTaP/Tdap/Td series (2 - Td) 12/12/2026 Allergies as of 6/28/2017  Review Complete On: 3/24/2017 By: Kan Booker NP No Known Allergies Current Immunizations  Reviewed on 7/13/2016 No immunizations on file. Not reviewed this visit You Were Diagnosed With   
  
 Codes Comments ADD (attention deficit disorder)     ICD-10-CM: F98.8 ICD-9-CM: 314.00 Vitals BP Pulse Temp Resp Height(growth percentile) Weight(growth percentile) 112/76 64 98.4 °F (36.9 °C) (Oral) 16 5' 6\" (1.676 m) 144 lb (65.3 kg) SpO2 BMI Smoking Status 99% 23.24 kg/m2 Never Smoker Vitals History BMI and BSA Data Body Mass Index Body Surface Area  
 23.24 kg/m 2 1.74 m 2 Preferred Pharmacy Pharmacy Name Phone [address] [facility] OF  [facility] [phone] Your Updated Medication List  
  
   
This list is accurate as of: 6/28/17  4:35 PM.  Always use your most recent med list.  
  
  
  
  
 * methylphenidate 54 mg CR tablet Commonly known as:  CONCERTA Take 1 Tab (54 mg total) by mouth daily. Max Daily Amount: 54 mg  
  
 * methylphenidate 54 mg CR tablet Commonly known as:  CONCERTA Take 1 Tab (54 mg total) by mouth every morningEarliest Fill Date: 7/28/17. Max Daily Amount: 54 mg Start taking on:  7/28/2017 * methylphenidate 54 mg CR tablet Commonly known as:  CONCERTA Earliest Fill Date: 8/27/17. Take 1 po qam  
Start taking on:  8/27/2017 * Notice:   This list has 3 medication(s) that are the same as other medications prescribed for you. Read the directions carefully, and ask your doctor or other care provider to review them with you. Prescriptions Printed Refills  
 methylphenidate ER 54 mg 24 hr tab 0 Starting on: 8/27/2017 Sig: Earliest Val[place] Mercury Date: 8/27/17. Take 1 po qam  
 Class: Print  
 methylphenidate ER 54 mg 24 hr tab 0 Starting on: 7/28/2017 Sig: Take 1 Tab (54 mg total) by mouth every morningEarliest Fill Date: 7/28/17. Max Daily Amount: 54 mg Class: Print Route: Oral  
 methylphenidate ER 54 mg 24 hr tab 0 Sig: Take 1 Tab (54 mg total) by mouth daily. Max Daily Amount: 54 mg Class: Print Route: Oral  
  
Follow-up Instructions Return in about 3 months (around 9/28/2017) for ADD meds. Patient Instructions Attention Deficit Hyperactivity Disorder (ADHD) in Adults: Care Instructions Your Care Instructions Attention deficit hyperactivity disorder, or ADHD, is a condition that makes it hard to pay attention. So you may have problems when you try to focus, get organized, and finish tasks. It might make you more active than other people. Or you might do things without thinking first. 
ADHD is very common. It usually starts in early childhood. Many adults don't realize they have it until their children are diagnosed. Then they become aware of their own symptoms. Doctors don't know what causes ADHD. But it often runs in families. ADHD can be treated with medicines, behavior training, and counseling. Treatment can improve your life. Follow-up care is a key part of your treatment and safety. Be sure to make and go to all appointments, and call your doctor if you are having problems. It's also a good idea to know your test results and keep a list of the medicines you take. How can you care for yourself at home? · Learn all you can about ADHD. This will help you and your family understand it better. · Take your medicines exactly as prescribed. Call your doctor if you think you are having a problem with your medicine. You will get more details on the specific medicines your doctor prescribes. · If you miss a dose of your medicine, do not take an extra dose. · If your doctor suggests counseling, find a counselor you like and trust. Talk openly and honestly. Be willing to make some changes. · Find a support group for adults with ADHD. Talking to others with the same problems can help you feel better. It can also give you ideas about how to best cope with the condition. · Get rid of distractions at your work space. Keep your desk clean. Try not to face a window or busy hallway. · Use files, planners, and other tools to keep you organized. · Limit use of alcohol, and do not use illegal drugs. People with ADHD tend to become addicted more easily than others. Tell your doctor if you need help to quit. Counseling, support groups, and sometimes medicines can help you stay free of alcohol or drugs. · Get at least 30 minutes of physical activity on most days of the week. Exercise has been shown to help people cope with ADHD. Walking is a good choice. You also may want to do other activities, such as running, swimming, cycling, or playing tennis or team sports. When should you call for help? Watch closely for changes in your health, and be sure to contact your doctor if: 
· You feel sad a lot or cry all the time. · You have trouble sleeping, or you sleep too much. · You find it hard to concentrate, make decisions, or remember things. · You change how you normally eat. · You feel guilty for no reason. Where can you learn more? Go to http://mekhi-kady.info/. Enter B196 in the search box to learn more about \"Attention Deficit Hyperactivity Disorder (ADHD) in Adults: Care Instructions. \" Current as of: July 26, 2016 Content Version: 11.3 © 6649-9002 Healthwise, Incorporated. Care instructions adapted under license by [employer] (which disclaims liability or warranty for this information). If you have questions about a medical condition or this instruction, always ask your healthcare professional. [address] any warranty or liability for your use of this information. Introducing [facility]! Dear Camila Dalal: Thank you for requesting a Viewabill account. Our records indicate that you already have an active Viewabill account. You can access your account anytime at [email or web address] Did you know that you can access your hospital and ER discharge instructions at any time in Viewabill? You can also review all of your test results from your hospital stay or ER visit. Additional Information If you have questions, please visit the Frequently Asked Questions section of the Viewabill website at [email or web address] Remember, Viewabill is NOT to be used for urgent needs. For medical emergencies, dial 911. Now available from your iPhone and Android! Please provide this summary of care documentation to your next provider. Your primary care clinician is listed as GURPREET LANGE. If you have any questions after today's visit, please call [phone].

## 2017-06-30 ENCOUNTER — DOCUMENTATION ONLY (OUTPATIENT)
Dept: FAMILY MEDICINE CLINIC | Age: 22
End: 2017-06-30

## 2017-06-30 NOTE — PROGRESS NOTES
[place] [employer] form for methylphenidate completed and placed on Wen's desk for signature then fax.

## 2017-07-06 ENCOUNTER — DOCUMENTATION ONLY (OUTPATIENT)
Dept: FAMILY MEDICINE CLINIC | Age: 22
End: 2017-07-06

## 2017-07-10 NOTE — PROGRESS NOTES
Please inform pt insurance will only cover branded formulation, does he want to call his pharmacy and see if he will be able to afford before we try to figure out another medication that may work for him? He has already tried and failed multiple meds.    Thanks,  N

## 2017-07-13 ENCOUNTER — OFFICE VISIT (OUTPATIENT)
Dept: FAMILY MEDICINE CLINIC | Age: 22
End: 2017-07-13

## 2017-07-13 VITALS
DIASTOLIC BLOOD PRESSURE: 71 MMHG | HEIGHT: 66 IN | TEMPERATURE: 99.1 F | OXYGEN SATURATION: 99 % | SYSTOLIC BLOOD PRESSURE: 116 MMHG | WEIGHT: 150 LBS | BODY MASS INDEX: 24.11 KG/M2 | HEART RATE: 71 BPM | RESPIRATION RATE: 16 BRPM

## 2017-07-13 DIAGNOSIS — F98.8 ADD (ATTENTION DEFICIT DISORDER): Primary | ICD-10-CM

## 2017-07-13 NOTE — PROGRESS NOTES
1. Have you been to the ER, urgent care clinic since your last visit? Hospitalized since your last visit? No    2. Have you seen or consulted any other health care providers outside of the [address] [place] [address] since your last visit? Include any pap smears or colon screening.  No     Chief Complaint   Patient presents with    Medication Evaluation     Concerta

## 2017-07-13 NOTE — PROGRESS NOTES
Chief Complaint   Patient presents with    Medication Evaluation     Concerta     he is a 23y.o. year old male who presents for evalution. Pt here to discuss ADD medication. Has tried and failed Vyvanse and Adderall XR previously. Was doing well on generic Concerta but now insurance is stating will not cover generic, only brand and pt states copay is $240 at pharmacy. Here to discuss other options. Was also told at check in that he has secondary insurance through [employer] but he states this is not he case. Wondering if that is what is causing problems with medication. Reviewed PmHx, RxHx, FmHx, SocHx, AllgHx and updated and dated in the chart. Review of Systems - negative except as listed above in the HPI    Objective:     Vitals:    07/13/17 1605   BP: 116/71   Pulse: 71   Resp: 16   Temp: 99.1 °F (37.3 °C)   TempSrc: Oral   SpO2: 99%   Weight: 150 lb (68 kg)   Height: 5' 6\" (1.676 m)     Physical Examination: General appearance - alert, well appearing, and in no distress  Mental status - alert, oriented to person, place, and time, normal mood, behavior, speech, dress, motor activity, and thought processes  Neurological - alert, oriented, normal speech, no focal findings or movement disorder noted    Assessment/ Plan:   Starr Banda was seen today for medication evaluation. Diagnoses and all orders for this visit:    ADD (attention deficit disorder)  Discussed with pt, willing to try Ritalin [place] if covered. Will contact insurance company to see what is on formulary and to clarify what will be covered. Pt voiced understanding regarding plan of care. Follow-up Disposition:  Return if symptoms worsen or fail to improve. I have discussed the diagnosis with the patient and the intended plan as seen in the above orders. The patient has received an after-visit summary and questions were answered concerning future plans.      Medication Side Effects and Warnings were discussed with patient    Bucky Debe Free, NP

## 2017-07-13 NOTE — MR AVS SNAPSHOT
Visit Information Date & Time Provider Department Dept. Phone Encounter #  
 7/13/2017  3:50 PM [facility] [phone] [id number] Follow-up Instructions Return if symptoms worsen or fail to improve. Upcoming Health Maintenance Date Due INFLUENZA AGE 9 TO ADULT 8/1/2017 DTaP/Tdap/Td series (2 - Td) 12/12/2026 Allergies as of 7/13/2017  Review Complete On: 7/13/2017 By: Niru Jerry NP No Known Allergies Current Immunizations  Reviewed on 7/13/2016 No immunizations on file. Not reviewed this visit You Were Diagnosed With   
  
 Codes Comments ADD (attention deficit disorder)    -  Primary ICD-10-CM: F98.8 ICD-9-CM: 314.00 Vitals BP Pulse Temp Resp Height(growth percentile) Weight(growth percentile) 116/71 71 99.1 °F (37.3 °C) (Oral) 16 5' 6\" (1.676 m) 150 lb (68 kg) SpO2 BMI Smoking Status 99% 24.21 kg/m2 Never Smoker Vitals History BMI and BSA Data Body Mass Index Body Surface Area  
 24.21 kg/m 2 1.78 m 2 Preferred Pharmacy Pharmacy Name Phone [address] [facility] [phone] Your Updated Medication List  
  
   
This list is accurate as of: 7/13/17  4:22 PM.  Always use your most recent med list.  
  
  
  
  
 * methylphenidate 54 mg CR tablet Commonly known as:  CONCERTA Take 1 Tab (54 mg total) by mouth daily. Max Daily Amount: 54 mg  
  
 * methylphenidate 54 mg CR tablet Commonly known as:  CONCERTA Take 1 Tab (54 mg total) by mouth every morningEarliest Fill Date: 7/28/17. Max Daily Amount: 54 mg Start taking on:  7/28/2017 * methylphenidate 54 mg CR tablet Commonly known as:  CONCERTA Earliest Fill Date: 8/27/17. Take 1 po qam  
Start taking on:  8/27/2017 * Notice:   This list has 3 medication(s) that are the same as other medications prescribed for you. Read the directions carefully, and ask your doctor or other care provider to review them with you. Follow-up Instructions Return if symptoms worsen or fail to improve. Patient Instructions Call [employer] - tell them in doctor's office there was notice stating they believe you have [employer] - you do NOT have aetna and they need to fix this. If no change in prescription then ask what they will cover that is a long acting ADD medication. Introducing \[facility]\"" & [facility]! Dear Megan Wright: Thank you for requesting a enModus account. Our records indicate that you already have an active enModus account. You can access your account anytime at [email or web address] Did you know that you can access your hospital and ER discharge instructions at any time in enModus? You can also review all of your test results from your hospital stay or ER visit. Additional Information If you have questions, please visit the Frequently Asked Questions section of the enModus website at [email or web address] Remember, enModus is NOT to be used for urgent needs. For medical emergencies, dial 911. Now available from your iPhone and Android! Please provide this summary of care documentation to your next provider. Your primary care clinician is listed as GURPREET LANGE. If you have any questions after today's visit, please call [phone].

## 2017-07-13 NOTE — PATIENT INSTRUCTIONS
Call [employer] - tell them in doctor's office there was notice stating they believe you have [employer] - you do NOT have aetna and they need to fix this. If no change in prescription then ask what they will cover that is a long acting ADD medication.

## 2017-10-19 ENCOUNTER — OFFICE VISIT (OUTPATIENT)
Dept: FAMILY MEDICINE CLINIC | Age: 22
End: 2017-10-19

## 2017-10-19 VITALS
OXYGEN SATURATION: 99 % | HEIGHT: 66 IN | BODY MASS INDEX: 24.59 KG/M2 | RESPIRATION RATE: 16 BRPM | WEIGHT: 153 LBS | DIASTOLIC BLOOD PRESSURE: 64 MMHG | TEMPERATURE: 99.8 F | HEART RATE: 77 BPM | SYSTOLIC BLOOD PRESSURE: 130 MMHG

## 2017-10-19 DIAGNOSIS — F98.8 ATTENTION DEFICIT DISORDER, UNSPECIFIED HYPERACTIVITY PRESENCE: Primary | ICD-10-CM

## 2017-10-19 RX ORDER — METHYLPHENIDATE HYDROCHLORIDE 54 MG/1
TABLET ORAL
Qty: 30 TAB | Refills: 0 | Status: SHIPPED | OUTPATIENT
Start: 2017-12-18 | End: 2018-03-01 | Stop reason: SDUPTHER

## 2017-10-19 RX ORDER — METHYLPHENIDATE HYDROCHLORIDE 54 MG/1
54 TABLET ORAL
Qty: 30 TAB | Refills: 0 | Status: SHIPPED | OUTPATIENT
Start: 2017-11-18 | End: 2018-03-01 | Stop reason: SDUPTHER

## 2017-10-19 RX ORDER — METHYLPHENIDATE HYDROCHLORIDE 54 MG/1
54 TABLET ORAL DAILY
Qty: 30 TAB | Refills: 0 | Status: SHIPPED | OUTPATIENT
Start: 2017-10-19 | End: 2018-03-01 | Stop reason: SDUPTHER

## 2017-10-19 NOTE — PATIENT INSTRUCTIONS
Attention Deficit Hyperactivity Disorder (ADHD) in Adults: Care Instructions  Your Care Instructions  Attention deficit hyperactivity disorder, or ADHD, is a condition that makes it hard to pay attention. So you may have problems when you try to focus, get organized, and finish tasks. It might make you more active than other people. Or you might do things without thinking first.  ADHD is very common. It usually starts in early childhood. Many adults don't realize they have it until their children are diagnosed. Then they become aware of their own symptoms. Doctors don't know what causes ADHD. But it often runs in families. ADHD can be treated with medicines, behavior training, and counseling. Treatment can improve your life. Follow-up care is a key part of your treatment and safety. Be sure to make and go to all appointments, and call your doctor if you are having problems. It's also a good idea to know your test results and keep a list of the medicines you take. How can you care for yourself at home? · Learn all you can about ADHD. This will help you and your family understand it better. · Take your medicines exactly as prescribed. Call your doctor if you think you are having a problem with your medicine. You will get more details on the specific medicines your doctor prescribes. · If you miss a dose of your medicine, do not take an extra dose. · If your doctor suggests counseling, find a counselor you like and trust. Talk openly and honestly. Be willing to make some changes. · Find a support group for adults with ADHD. Talking to others with the same problems can help you feel better. It can also give you ideas about how to best cope with the condition. · Get rid of distractions at your work space. Keep your desk clean. Try not to face a window or busy hallway. · Use files, planners, and other tools to keep you organized. · Limit use of alcohol, and do not use illegal drugs.  People with ADHD tend to become addicted more easily than others. Tell your doctor if you need help to quit. Counseling, support groups, and sometimes medicines can help you stay free of alcohol or drugs. · Get at least 30 minutes of physical activity on most days of the week. Exercise has been shown to help people cope with ADHD. Walking is a good choice. You also may want to do other activities, such as running, swimming, cycling, or playing tennis or team sports. When should you call for help? Watch closely for changes in your health, and be sure to contact your doctor if:  · You feel sad a lot or cry all the time. · You have trouble sleeping, or you sleep too much. · You find it hard to concentrate, make decisions, or remember things. · You change how you normally eat. · You feel guilty for no reason. Where can you learn more? Go to http://mekhi-kady.info/. Enter B196 in the search box to learn more about \"Attention Deficit Hyperactivity Disorder (ADHD) in Adults: Care Instructions. \"  Current as of: July 26, 2016  Content Version: 11.3  © 4284-4225 [employer]ell [employer], Incorporated. Care instructions adapted under license by [employer] (which disclaims liability or warranty for this information). If you have questions about a medical condition or this instruction, always ask your healthcare professional. [address] any warranty or liability for your use of this information.

## 2017-10-19 NOTE — PROGRESS NOTES
Chief Complaint   Patient presents with    Medication Refill     he is a 23y.o. year old male who presents for evalution. Pt here for ADD refills. Doing well on current dose of medication. No problems with lack of appetite or insomnia. Reviewed PmHx, RxHx, FmHx, SocHx, AllgHx and updated and dated in the chart. Review of Systems - negative except as listed above in the HPI    Objective:     Vitals:    10/19/17 1611   BP: 130/64   Pulse: 77   Resp: 16   Temp: 99.8 °F (37.7 °C)   TempSrc: Oral   SpO2: 99%   Weight: 153 lb (69.4 kg)   Height: 5' 6\" (1.676 m)     Physical Examination: General appearance - alert, well appearing, and in no distress  Mental status - alert, oriented to person, place, and time, normal mood, behavior, speech, dress, motor activity, and thought processes  Chest - clear to auscultation, no wheezes, rales or rhonchi, symmetric air entry  Heart - normal rate, regular rhythm, normal S1, S2, no murmurs, rubs, clicks or gallops    Assessment/ Plan:   Diagnoses and all orders for this visit:    1. Attention deficit disorder, unspecified hyperactivity presence  -     methylphenidate ER 54 mg 24 hr tab; Earliest Fill Date: 12/18/17. Take 1 po qam  -     methylphenidate ER 54 mg 24 hr tab; Take 1 Tab (54 mg total) by mouth every morningEarliest Fill Date: 11/18/17. Max Daily Amount: 54 mg  -     methylphenidate ER 54 mg 24 hr tab; Take 1 Tab (54 mg total) by mouth daily. Max Daily Amount: 54 mg  Reviewed , pt taking appropriately. F/U 3 mo. Pt voiced understanding regarding plan of care. Follow-up Disposition:  Return in about 3 months (around 1/19/2018) for ADD. I have discussed the diagnosis with the patient and the intended plan as seen in the above orders. The patient has received an after-visit summary and questions were answered concerning future plans.      Medication Side Effects and Warnings were discussed with patient    Desirae Barrientos NP

## 2017-10-19 NOTE — MR AVS SNAPSHOT
Visit Information Date & Time Provider Department Dept. Phone Encounter #  
 10/19/2017  4:05 PM Orquidea Whitley NP [address] [facility] [phone] [id number] Follow-up Instructions Return in about 3 months (around 1/19/2018) for ADD. Upcoming Health Maintenance Date Due INFLUENZA AGE 9 TO ADULT 8/1/2017 DTaP/Tdap/Td series (2 - Td) 12/12/2026 Allergies as of 10/19/2017  Review Complete On: 10/19/2017 By: Adriana Dunn LPN No Known Allergies Current Immunizations  Reviewed on 7/13/2016 No immunizations on file. Not reviewed this visit You Were Diagnosed With   
  
 Codes Comments Attention deficit disorder, unspecified hyperactivity presence    -  Primary ICD-10-CM: F98.8 ICD-9-CM: 314.00 Vitals BP Pulse Temp Resp Height(growth percentile) Weight(growth percentile) 130/64 77 99.8 °F (37.7 °C) (Oral) 16 5' 6\" (1.676 m) 153 lb (69.4 kg) SpO2 BMI Smoking Status 99% 24.69 kg/m2 Never Smoker BMI and BSA Data Body Mass Index Body Surface Area  
 24.69 kg/m 2 1.8 m 2 Preferred Pharmacy Pharmacy Name Phone [facility] [address] [place] [facility] OF  [facility] [phone] Your Updated Medication List  
  
   
This list is accurate as of: 10/19/17  4:17 PM.  Always use your most recent med list.  
  
  
  
  
 * methylphenidate HCl 54 mg CR tablet Commonly known as:  CONCERTA Take 1 Tab (54 mg total) by mouth daily. Max Daily Amount: 54 mg  
  
 * methylphenidate HCl 54 mg CR tablet Commonly known as:  CONCERTA Take 1 Tab (54 mg total) by mouth every morningEarliest Fill Date: 11/18/17. Max Daily Amount: 54 mg Start taking on:  11/18/2017 * methylphenidate HCl 54 mg CR tablet Commonly known as:  CONCERTA Earliest Fill Date: 12/18/17. Take 1 po qam  
Start taking on:  12/18/2017 * Notice: This list has 3 medication(s) that are the same as other medications prescribed for you. Read the directions carefully, and ask your doctor or other care provider to review them with you. Prescriptions Printed Refills  
 methylphenidate ER 54 mg 24 hr tab 0 Starting on: 12/18/2017 Sig: Young Rincons Date: 12/18/17. Take 1 po qam  
 Class: Print  
 methylphenidate ER 54 mg 24 hr tab 0 Starting on: 11/18/2017 Sig: Take 1 Tab (54 mg total) by mouth every morningEarliest Fill Date: 11/18/17. Max Daily Amount: 54 mg Class: Print Route: Oral  
 methylphenidate ER 54 mg 24 hr tab 0 Sig: Take 1 Tab (54 mg total) by mouth daily. Max Daily Amount: 54 mg Class: Print Route: Oral  
  
Follow-up Instructions Return in about 3 months (around 1/19/2018) for ADD. Patient Instructions Attention Deficit Hyperactivity Disorder (ADHD) in Adults: Care Instructions Your Care Instructions Attention deficit hyperactivity disorder, or ADHD, is a condition that makes it hard to pay attention. So you may have problems when you try to focus, get organized, and finish tasks. It might make you more active than other people. Or you might do things without thinking first. 
ADHD is very common. It usually starts in early childhood. Many adults don't realize they have it until their children are diagnosed. Then they become aware of their own symptoms. Doctors don't know what causes ADHD. But it often runs in families. ADHD can be treated with medicines, behavior training, and counseling. Treatment can improve your life. Follow-up care is a key part of your treatment and safety. Be sure to make and go to all appointments, and call your doctor if you are having problems. It's also a good idea to know your test results and keep a list of the medicines you take. How can you care for yourself at home? · Learn all you can about ADHD.  This will help you and your family understand it better. · Take your medicines exactly as prescribed. Call your doctor if you think you are having a problem with your medicine. You will get more details on the specific medicines your doctor prescribes. · If you miss a dose of your medicine, do not take an extra dose. · If your doctor suggests counseling, find a counselor you like and trust. Talk openly and honestly. Be willing to make some changes. · Find a support group for adults with ADHD. Talking to others with the same problems can help you feel better. It can also give you ideas about how to best cope with the condition. · Get rid of distractions at your work space. Keep your desk clean. Try not to face a window or busy hallway. · Use files, planners, and other tools to keep you organized. · Limit use of alcohol, and do not use illegal drugs. People with ADHD tend to become addicted more easily than others. Tell your doctor if you need help to quit. Counseling, support groups, and sometimes medicines can help you stay free of alcohol or drugs. · Get at least 30 minutes of physical activity on most days of the week. Exercise has been shown to help people cope with ADHD. Walking is a good choice. You also may want to do other activities, such as running, swimming, cycling, or playing tennis or team sports. When should you call for help? Watch closely for changes in your health, and be sure to contact your doctor if: 
· You feel sad a lot or cry all the time. · You have trouble sleeping, or you sleep too much. · You find it hard to concentrate, make decisions, or remember things. · You change how you normally eat. · You feel guilty for no reason. Where can you learn more? Go to http://mekhi-kady.info/. Enter B196 in the search box to learn more about \"Attention Deficit Hyperactivity Disorder (ADHD) in Adults: Care Instructions. \" Current as of: July 26, 2016 Content Version: 11.3 © 3192-7449 Healthwise, Incorporated. Care instructions adapted under license by [employer] (which disclaims liability or warranty for this information). If you have questions about a medical condition or this instruction, always ask your healthcare professional. [address] any warranty or liability for your use of this information. Introducing [facility] & HEALTH SERVICES! Dear Dora Graham: Thank you for requesting a [employer] account. Our records indicate that you already have an active [employer] account. You can access your account anytime at [email or web address] Did you know that you can access your hospital and ER discharge instructions at any time in [employer]? You can also review all of your test results from your hospital stay or ER visit. Additional Information If you have questions, please visit the Frequently Asked Questions section of the [employer] website at [email or web address] Remember, [employer] is NOT to be used for urgent needs. For medical emergencies, dial 911. Now available from your iPhone and Android! Please provide this summary of care documentation to your next provider. Your primary care clinician is listed as GURPREET LANGE. If you have any questions after today's visit, please call [phone].

## 2017-10-19 NOTE — PROGRESS NOTES
1. Have you been to the ER, urgent care clinic since your last visit? Hospitalized since your last visit? No    2. Have you seen or consulted any other health care providers outside of the [address] [place] [address] since your last visit? Include any pap smears or colon screening.  No   Chief Complaint   Patient presents with    Medication Refill

## 2018-03-01 ENCOUNTER — OFFICE VISIT (OUTPATIENT)
Dept: FAMILY MEDICINE CLINIC | Age: 23
End: 2018-03-01

## 2018-03-01 VITALS
BODY MASS INDEX: 24.75 KG/M2 | SYSTOLIC BLOOD PRESSURE: 109 MMHG | DIASTOLIC BLOOD PRESSURE: 66 MMHG | TEMPERATURE: 98.1 F | WEIGHT: 154 LBS | RESPIRATION RATE: 16 BRPM | HEIGHT: 66 IN | OXYGEN SATURATION: 99 % | HEART RATE: 68 BPM

## 2018-03-01 DIAGNOSIS — F98.8 ATTENTION DEFICIT DISORDER, UNSPECIFIED HYPERACTIVITY PRESENCE: ICD-10-CM

## 2018-03-01 RX ORDER — METHYLPHENIDATE HYDROCHLORIDE 54 MG/1
TABLET ORAL
Qty: 30 TAB | Refills: 0 | Status: SHIPPED | OUTPATIENT
Start: 2018-04-29 | End: 2018-06-11 | Stop reason: SDUPTHER

## 2018-03-01 RX ORDER — METHYLPHENIDATE HYDROCHLORIDE 54 MG/1
54 TABLET ORAL DAILY
Qty: 30 TAB | Refills: 0 | Status: SHIPPED | OUTPATIENT
Start: 2018-03-01 | End: 2018-06-11 | Stop reason: SDUPTHER

## 2018-03-01 RX ORDER — METHYLPHENIDATE HYDROCHLORIDE 54 MG/1
54 TABLET ORAL
Qty: 30 TAB | Refills: 0 | Status: SHIPPED | OUTPATIENT
Start: 2018-03-31 | End: 2018-06-11 | Stop reason: SDUPTHER

## 2018-03-01 RX ORDER — DEXTROAMPHETAMINE SACCHARATE, AMPHETAMINE ASPARTATE, DEXTROAMPHETAMINE SULFATE AND AMPHETAMINE SULFATE 2.5; 2.5; 2.5; 2.5 MG/1; MG/1; MG/1; MG/1
10 TABLET ORAL DAILY
Qty: 30 TAB | Refills: 0 | Status: SHIPPED | OUTPATIENT
Start: 2018-03-01 | End: 2018-03-29 | Stop reason: SDUPTHER

## 2018-03-01 NOTE — PROGRESS NOTES
1. Have you been to the ER, urgent care clinic since your last visit? Hospitalized since your last visit? No    2. Have you seen or consulted any other health care providers outside of the [address] [place] [address] since your last visit? Include any pap smears or colon screening.  No   Chief Complaint   Patient presents with    Medication Refill     Methylphenidate

## 2018-03-01 NOTE — PROGRESS NOTES
Chief Complaint   Patient presents with    Medication Refill     Methylphenidate     he is a 23y.o. year old male who presents for evalution. Pt tried to stop taking medication - no problems at home but then boss at work noticed decline in performance. Pt also states has restarted taking classes at night time in Jan and is having problems focusing. States even when taking Concerta felt like did not last entire day, seemed to wear off by 3pm.  Requesting shorter acting medication to use on nights when has class - usually 4 times weekly. Pt has tried and failed Vyvanse and Adderall XR previously. Reviewed PmHx, RxHx, FmHx, SocHx, AllgHx and updated and dated in the chart. Review of Systems - negative except as listed above in the HPI    Objective:     Vitals:    03/01/18 1610   BP: 109/66   Pulse: 68   Resp: 16   Temp: 98.1 °F (36.7 °C)   TempSrc: Oral   SpO2: 99%   Weight: 154 lb (69.9 kg)   Height: 5' 6\" (1.676 m)     Physical Examination: General appearance - alert, well appearing, and in no distress  Mental status - alert, oriented to person, place, and time, normal mood, behavior, speech, dress, motor activity, and thought processes  Chest - clear to auscultation, no wheezes, rales or rhonchi, symmetric air entry  Heart - normal rate, regular rhythm, normal S1, S2, no murmurs, rubs, clicks or gallops    Assessment/ Plan:   Diagnoses and all orders for this visit:    1. Attention deficit disorder, unspecified hyperactivity presence  -     methylphenidate ER 54 mg 24 hr tab; Earliest Fill Date: 4/29/18. Take 1 po qam  -     methylphenidate ER 54 mg 24 hr tab; Take 1 Tab (54 mg total) by mouth every morningEarliest Fill Date: 3/31/18. Max Daily Amount: 54 mg  -     methylphenidate ER 54 mg 24 hr tab; Take 1 Tab (54 mg total) by mouth dailyEarliest Fill Date: 3/1/18. Max Daily Amount: 54 mg  -     dextroamphetamine-amphetamine (ADDERALL) 10 mg tablet; Take 1 Tab (10 mg total) by mouth daily. For use in afternoon as needed, in addition to Concerta daily Max Daily Amount: 10 mg  Add on rx for afternoon use on days when has class. Reviewed , no suspicious fills. F/U 3 mo. If Adderall working on school days and runs out before 3 mo then may send me BURLESQUICEOUS and will leave additional #30 rx for pick-up. Pt voiced understanding regarding plan of care. Follow-up Disposition:  Return in about 3 months (around 6/1/2018) for ADD . I have discussed the diagnosis with the patient and the intended plan as seen in the above orders. The patient has received an after-visit summary and questions were answered concerning future plans.      Medication Side Effects and Warnings were discussed with patient    Brian Souza NP

## 2018-03-01 NOTE — MR AVS SNAPSHOT
[address] [address] 
[phone] Patient: Tim Shaffer MRN: [id number] :1995 Visit Information Date & Time Provider Department Dept. Phone Encounter #  
 3/1/2018  4:05 PM Parvin Blackwell NP [address] [facility] [phone] [id number] Follow-up Instructions Return in about 3 months (around 2018) for ADD . Upcoming Health Maintenance Date Due DTaP/Tdap/Td series (2 - Td) 2026 Allergies as of 3/1/2018  Review Complete On: 10/19/2017 By: Parvin Blackwell NP No Known Allergies Current Immunizations  Reviewed on 2016 No immunizations on file. Not reviewed this visit You Were Diagnosed With   
  
 Codes Comments Attention deficit disorder, unspecified hyperactivity presence     ICD-10-CM: F98.8 ICD-9-CM: 314.00 Vitals BP Pulse Temp Resp Height(growth percentile) Weight(growth percentile) 109/66 68 98.1 °F (36.7 °C) (Oral) 16 5' 6\" (1.676 m) 154 lb (69.9 kg) SpO2 BMI Smoking Status 99% 24.86 kg/m2 Never Smoker Vitals History BMI and BSA Data Body Mass Index Body Surface Area  
 24.86 kg/m 2 1.8 m 2 Preferred Pharmacy Pharmacy Name Phone [address] [place] [address] AT [facility] OF  [facility] [phone] Your Updated Medication List  
  
   
This list is accurate as of 3/1/18  4:44 PM.  Always use your most recent med list.  
  
  
  
  
 dextroamphetamine-amphetamine 10 mg tablet Commonly known as:  ADDERALL Take 1 Tab (10 mg total) by mouth daily. For use in afternoon as needed, in addition to Concerta daily Max Daily Amount: 10 mg  
  
 * methylphenidate HCl 54 mg CR tablet Commonly known as:  CONCERTA Take 1 Tab (54 mg total) by mouth dailyEarliest Fill Date: 3/1/18. Max Daily Amount: 54 mg  
  
 * methylphenidate HCl 54 mg CR tablet Commonly known as:  CONCERTA Take 1 Tab (54 mg total) by mouth every morningEarliest Fill Date: 3/31/18. Max Daily Amount: 54 mg Start taking on:  3/31/2018 * methylphenidate HCl 54 mg CR tablet Commonly known as:  CONCERTA Earliest Fill Date: 4/29/18. Take 1 po qam  
Start taking on:  4/29/2018 * Notice: This list has 3 medication(s) that are the same as other medications prescribed for you. Read the directions carefully, and ask your doctor or other care provider to review them with you. Prescriptions Printed Refills  
 methylphenidate ER 54 mg 24 hr tab 0 Starting on: 4/29/2018 Sig: Earliest Lubertha Bock Date: 4/29/18. Take 1 po qam  
 Class: Print  
 methylphenidate ER 54 mg 24 hr tab 0 Starting on: 3/31/2018 Sig: Take 1 Tab (54 mg total) by mouth every morningEarliest Fill Date: 3/31/18. Max Daily Amount: 54 mg Class: Print Route: Oral  
 methylphenidate ER 54 mg 24 hr tab 0 Sig: Take 1 Tab (54 mg total) by mouth dailyEarliest Fill Date: 3/1/18. Max Daily Amount: 54 mg Class: Print Route: Oral  
 dextroamphetamine-amphetamine (ADDERALL) 10 mg tablet 0 Sig: Take 1 Tab (10 mg total) by mouth daily. For use in afternoon as needed, in addition to Concerta daily Max Daily Amount: 10 mg  
 Class: Print Route: Oral  
  
Follow-up Instructions Return in about 3 months (around 6/1/2018) for ADD . Patient Instructions Attention Deficit Hyperactivity Disorder (ADHD) in Adults: Care Instructions Your Care Instructions Attention deficit hyperactivity disorder, or ADHD, is a condition that makes it hard to pay attention. So you may have problems when you try to focus, get organized, and finish tasks. It might make you more active than other people. Or you might do things without thinking first. 
ADHD is very common. It usually starts in early childhood.  Many adults don't realize they have it until their children are diagnosed. Then they become aware of their own symptoms. Doctors don't know what causes ADHD. But it often runs in families. ADHD can be treated with medicines, behavior training, and counseling. Treatment can improve your life. Follow-up care is a key part of your treatment and safety. Be sure to make and go to all appointments, and call your doctor if you are having problems. It's also a good idea to know your test results and keep a list of the medicines you take. How can you care for yourself at home? · Learn all you can about ADHD. This will help you and your family understand it better. · Take your medicines exactly as prescribed. Call your doctor if you think you are having a problem with your medicine. You will get more details on the specific medicines your doctor prescribes. · If you miss a dose of your medicine, do not take an extra dose. · If your doctor suggests counseling, find a counselor you like and trust. Talk openly and honestly. Be willing to make some changes. · Find a support group for adults with ADHD. Talking to others with the same problems can help you feel better. It can also give you ideas about how to best cope with the condition. · Get rid of distractions at your work space. Keep your desk clean. Try not to face a window or busy hallway. · Use files, planners, and other tools to keep you organized. · Limit use of alcohol, and do not use illegal drugs. People with ADHD tend to become addicted more easily than others. Tell your doctor if you need help to quit. Counseling, support groups, and sometimes medicines can help you stay free of alcohol or drugs. · Get at least 30 minutes of physical activity on most days of the week. Exercise has been shown to help people cope with ADHD. Walking is a good choice. You also may want to do other activities, such as running, swimming, cycling, or playing tennis or team sports. When should you call for help? Watch closely for changes in your health, and be sure to contact your doctor if: 
? · You feel sad a lot or cry all the time. ? · You have trouble sleeping, or you sleep too much. ? · You find it hard to concentrate, make decisions, or remember things. ? · You change how you normally eat. ? · You feel guilty for no reason. Where can you learn more? Go to http://mekhi-kady.info/. Enter B196 in the search box to learn more about \"Attention Deficit Hyperactivity Disorder (ADHD) in Adults: Care Instructions. \" Current as of: May 12, 2017 Content Version: 11.4 © 9830-6792 [employer]. Care instructions adapted under license by [employer] (which disclaims liability or warranty for this information). If you have questions about a medical condition or this instruction, always ask your healthcare professional. [address] any warranty or liability for your use of this information. Introducing \[facility]\"" & HEALTH SERVICES! Dear Kay Pastrana: Thank you for requesting a [employer] account. Our records indicate that you already have an active [employer] account. You can access your account anytime at [email or web address] Did you know that you can access your hospital and ER discharge instructions at any time in [employer]? You can also review all of your test results from your hospital stay or ER visit. Additional Information If you have questions, please visit the Frequently Asked Questions section of the [employer] website at [email or web address] Remember, [employer] is NOT to be used for urgent needs. For medical emergencies, dial 911. Now available from your iPhone and Android! Please provide this summary of care documentation to your next provider. Your primary care clinician is listed as GURPREET LANGE.  If you have any questions after today's visit, please call [phone].

## 2018-03-01 NOTE — PATIENT INSTRUCTIONS
Attention Deficit Hyperactivity Disorder (ADHD) in Adults: Care Instructions  Your Care Instructions    Attention deficit hyperactivity disorder, or ADHD, is a condition that makes it hard to pay attention. So you may have problems when you try to focus, get organized, and finish tasks. It might make you more active than other people. Or you might do things without thinking first.  ADHD is very common. It usually starts in early childhood. Many adults don't realize they have it until their children are diagnosed. Then they become aware of their own symptoms. Doctors don't know what causes ADHD. But it often runs in families. ADHD can be treated with medicines, behavior training, and counseling. Treatment can improve your life. Follow-up care is a key part of your treatment and safety. Be sure to make and go to all appointments, and call your doctor if you are having problems. It's also a good idea to know your test results and keep a list of the medicines you take. How can you care for yourself at home? · Learn all you can about ADHD. This will help you and your family understand it better. · Take your medicines exactly as prescribed. Call your doctor if you think you are having a problem with your medicine. You will get more details on the specific medicines your doctor prescribes. · If you miss a dose of your medicine, do not take an extra dose. · If your doctor suggests counseling, find a counselor you like and trust. Talk openly and honestly. Be willing to make some changes. · Find a support group for adults with ADHD. Talking to others with the same problems can help you feel better. It can also give you ideas about how to best cope with the condition. · Get rid of distractions at your work space. Keep your desk clean. Try not to face a window or busy hallway. · Use files, planners, and other tools to keep you organized. · Limit use of alcohol, and do not use illegal drugs.  People with ADHD tend to become addicted more easily than others. Tell your doctor if you need help to quit. Counseling, support groups, and sometimes medicines can help you stay free of alcohol or drugs. · Get at least 30 minutes of physical activity on most days of the week. Exercise has been shown to help people cope with ADHD. Walking is a good choice. You also may want to do other activities, such as running, swimming, cycling, or playing tennis or team sports. When should you call for help? Watch closely for changes in your health, and be sure to contact your doctor if:  ? · You feel sad a lot or cry all the time. ? · You have trouble sleeping, or you sleep too much. ? · You find it hard to concentrate, make decisions, or remember things. ? · You change how you normally eat. ? · You feel guilty for no reason. Where can you learn more? Go to http://mekhi-kady.info/. Enter B196 in the search box to learn more about \"Attention Deficit Hyperactivity Disorder (ADHD) in Adults: Care Instructions. \"  Current as of: May 12, 2017  Content Version: 11.4  © 0891-9915 Healthwise, Incorporated. Care instructions adapted under license by [email or web address] (which disclaims liability or warranty for this information). If you have questions about a medical condition or this instruction, always ask your healthcare professional. [address] any warranty or liability for your use of this information.

## 2018-03-29 ENCOUNTER — OFFICE VISIT (OUTPATIENT)
Dept: FAMILY MEDICINE CLINIC | Age: 23
End: 2018-03-29

## 2018-03-29 VITALS
BODY MASS INDEX: 22.66 KG/M2 | DIASTOLIC BLOOD PRESSURE: 78 MMHG | SYSTOLIC BLOOD PRESSURE: 122 MMHG | HEIGHT: 66 IN | TEMPERATURE: 98.9 F | OXYGEN SATURATION: 98 % | HEART RATE: 96 BPM | WEIGHT: 141 LBS | RESPIRATION RATE: 16 BRPM

## 2018-03-29 DIAGNOSIS — F98.8 ATTENTION DEFICIT DISORDER (ADD) WITHOUT HYPERACTIVITY: Primary | ICD-10-CM

## 2018-03-29 RX ORDER — DEXTROAMPHETAMINE SACCHARATE, AMPHETAMINE ASPARTATE, DEXTROAMPHETAMINE SULFATE AND AMPHETAMINE SULFATE 2.5; 2.5; 2.5; 2.5 MG/1; MG/1; MG/1; MG/1
10 TABLET ORAL DAILY
Qty: 30 TAB | Refills: 0 | Status: SHIPPED | OUTPATIENT
Start: 2018-03-29 | End: 2018-06-11 | Stop reason: SDUPTHER

## 2018-03-29 NOTE — MR AVS SNAPSHOT
[address] [address] 
[phone] Patient: Jason Hartman MRN: [id number] :1995 Visit Information Date & Time Provider Department Dept. Phone Encounter #  
 3/29/2018  4:05 PM Young Taylor NP [address] [facility] [phone] [id number] Follow-up Instructions Return in about 2 months (around 2018) for ADD meds . Upcoming Health Maintenance Date Due DTaP/Tdap/Td series (2 - Td) 2026 Allergies as of 3/29/2018  Review Complete On: 3/1/2018 By: Young Taylor NP No Known Allergies Current Immunizations  Reviewed on 2016 No immunizations on file. Not reviewed this visit You Were Diagnosed With   
  
 Codes Comments Attention deficit disorder (ADD) without hyperactivity    -  Primary ICD-10-CM: F98.8 ICD-9-CM: 314.00 Vitals BP Pulse Temp Resp Height(growth percentile) Weight(growth percentile) 122/78 96 98.9 °F (37.2 °C) (Oral) 16 5' 6\" (1.676 m) 141 lb (64 kg) SpO2 BMI Smoking Status 98% 22.76 kg/m2 Never Smoker Vitals History BMI and BSA Data Body Mass Index Body Surface Area  
 22.76 kg/m 2 1.73 m 2 Preferred Pharmacy Pharmacy Name Phone [address] [place] AT [facility] OF  [facility] [phone] Your Updated Medication List  
  
   
This list is accurate as of 3/29/18  4:20 PM.  Always use your most recent med list.  
  
  
  
  
 dextroamphetamine-amphetamine 10 mg tablet Commonly known as:  ADDERALL Take 1 Tab (10 mg total) by mouth daily. For use in afternoon as needed, in addition to Concerta daily Max Daily Amount: 10 mg  
  
 * methylphenidate HCl 54 mg CR tablet Commonly known as:  CONCERTA Take 1 Tab (54 mg total) by mouth dailyEarliest Fill Date: 3/1/18.   Max Daily Amount: 54 mg  
  
 * methylphenidate HCl 54 mg CR tablet Commonly known as:  CONCERTA Take 1 Tab (54 mg total) by mouth every morningEarliest Fill Date: 3/31/18. Max Daily Amount: 54 mg Start taking on:  3/31/2018 * methylphenidate HCl 54 mg CR tablet Commonly known as:  CONCERTA Earliest Fill Date: 4/29/18. Take 1 po qam  
Start taking on:  4/29/2018 * Notice: This list has 3 medication(s) that are the same as other medications prescribed for you. Read the directions carefully, and ask your doctor or other care provider to review them with you. Prescriptions Printed Refills  
 dextroamphetamine-amphetamine (ADDERALL) 10 mg tablet 0 Sig: Take 1 Tab (10 mg total) by mouth daily. For use in afternoon as needed, in addition to Concerta daily Max Daily Amount: 10 mg  
 Class: Print Route: Oral  
  
Follow-up Instructions Return in about 2 months (around 5/29/2018) for ADD meds . Introducing [facility] SERVICES! Dear Patience Ruiz: Thank you for requesting a HubHuman account. Our records indicate that you already have an active HubHuman account. You can access your account anytime at [email or web address] Did you know that you can access your hospital and ER discharge instructions at any time in HubHuman? You can also review all of your test results from your hospital stay or ER visit. Additional Information If you have questions, please visit the Frequently Asked Questions section of the HubHuman website at [email or web address] Remember, HubHuman is NOT to be used for urgent needs. For medical emergencies, dial 911. Now available from your iPhone and Android! Please provide this summary of care documentation to your next provider. Your primary care clinician is listed as GURPREET LANGE. If you have any questions after today's visit, please call [phone].

## 2018-03-29 NOTE — PROGRESS NOTES
1. Have you been to the ER, urgent care clinic since your last visit? Hospitalized since your last visit? No    2. Have you seen or consulted any other health care providers outside of the [address] [place] [address] since your last visit? Include any pap smears or colon screening.  No     Chief Complaint   Patient presents with    Medication Refill     Adderall

## 2018-03-29 NOTE — PROGRESS NOTES
Chief Complaint   Patient presents with    Medication Refill     Adderall     he is a 23y.o. year old male who presents for evalution. Pt here for medication F/U. Has been taking Concerta daily, Adderall only on school days - 4 days weekly. Has noticed improvement while taking at school, seems like not as helpful in past week but pt wants to continue on current regimen. Reviewed PmHx, RxHx, FmHx, SocHx, AllgHx and updated and dated in the chart. Review of Systems - negative except as listed above in the HPI    Objective:     Vitals:    03/29/18 1606   BP: 122/78   Pulse: 96   Resp: 16   Temp: 98.9 °F (37.2 °C)   TempSrc: Oral   SpO2: 98%   Weight: 141 lb (64 kg)   Height: 5' 6\" (1.676 m)     Physical Examination: General appearance - alert, well appearing, and in no distress  Mental status - alert, oriented to person, place, and time, normal mood, behavior, speech, dress, motor activity, and thought processes  Chest - clear to auscultation, no wheezes, rales or rhonchi, symmetric air entry  Heart - normal rate, regular rhythm, normal S1, S2, no murmurs, rubs, clicks or gallops    Assessment/ Plan:   Diagnoses and all orders for this visit:    1. Attention deficit disorder (ADD) without hyperactivity  -     dextroamphetamine-amphetamine (ADDERALL) 10 mg tablet; Take 1 Tab (10 mg total) by mouth daily. For use in afternoon as needed, in addition to Concerta daily Max Daily Amount: 10 mg  Reviewed , no suspicious fills. Discussed switching pt to longer acting Mydayis, at this time pt would like to continue on current regimen. Refill provided. Pt voiced understanding regarding plan of care. Follow-up Disposition:  Return in about 2 months (around 5/29/2018) for ADD meds . I have discussed the diagnosis with the patient and the intended plan as seen in the above orders. The patient has received an after-visit summary and questions were answered concerning future plans.      Medication Side Effects and Warnings were discussed with patient    Shaka Gibson NP

## 2018-06-11 ENCOUNTER — OFFICE VISIT (OUTPATIENT)
Dept: FAMILY MEDICINE CLINIC | Age: 23
End: 2018-06-11

## 2018-06-11 VITALS
TEMPERATURE: 98.2 F | OXYGEN SATURATION: 100 % | HEIGHT: 66 IN | DIASTOLIC BLOOD PRESSURE: 74 MMHG | BODY MASS INDEX: 23.63 KG/M2 | HEART RATE: 67 BPM | SYSTOLIC BLOOD PRESSURE: 118 MMHG | WEIGHT: 147 LBS | RESPIRATION RATE: 16 BRPM

## 2018-06-11 DIAGNOSIS — F98.8 ATTENTION DEFICIT DISORDER (ADD) WITHOUT HYPERACTIVITY: Primary | ICD-10-CM

## 2018-06-11 RX ORDER — METHYLPHENIDATE HYDROCHLORIDE 54 MG/1
54 TABLET ORAL DAILY
Qty: 30 TAB | Refills: 0 | Status: SHIPPED | OUTPATIENT
Start: 2018-06-11 | End: 2022-02-11

## 2018-06-11 RX ORDER — METHYLPHENIDATE HYDROCHLORIDE 54 MG/1
54 TABLET ORAL
Qty: 30 TAB | Refills: 0 | Status: SHIPPED | OUTPATIENT
Start: 2018-07-11 | End: 2022-02-11

## 2018-06-11 RX ORDER — METHYLPHENIDATE HYDROCHLORIDE 54 MG/1
54 TABLET ORAL DAILY
Qty: 30 TAB | Refills: 0 | Status: SHIPPED | OUTPATIENT
Start: 2018-08-10 | End: 2022-02-11

## 2018-06-11 RX ORDER — DEXTROAMPHETAMINE SACCHARATE, AMPHETAMINE ASPARTATE, DEXTROAMPHETAMINE SULFATE AND AMPHETAMINE SULFATE 2.5; 2.5; 2.5; 2.5 MG/1; MG/1; MG/1; MG/1
10 TABLET ORAL DAILY
Qty: 30 TAB | Refills: 0 | Status: SHIPPED | OUTPATIENT
Start: 2018-07-11 | End: 2022-02-11

## 2018-06-11 RX ORDER — DEXTROAMPHETAMINE SACCHARATE, AMPHETAMINE ASPARTATE, DEXTROAMPHETAMINE SULFATE AND AMPHETAMINE SULFATE 2.5; 2.5; 2.5; 2.5 MG/1; MG/1; MG/1; MG/1
10 TABLET ORAL DAILY
Qty: 30 TAB | Refills: 0 | Status: SHIPPED | OUTPATIENT
Start: 2018-06-11 | End: 2022-02-11

## 2018-06-11 NOTE — PROGRESS NOTES
1. Have you been to the ER, urgent care clinic since your last visit? Hospitalized since your last visit? No    2. Have you seen or consulted any other health care providers outside of the [facility] since your last visit? Include any pap smears or colon screening.  No     Chief Complaint   Patient presents with    Medication Refill     Adderall

## 2018-06-11 NOTE — PROGRESS NOTES
Chief Complaint   Patient presents with    Medication Refill     Adderall     he is a 23y.o. year old male who presents for evalution. Pt here for ADD F/U. Doing well on current dose of medication. Adderall dosing in afternoon when has class has been helpful. Pt states classes changing over summer - doing courses online and not going to school 4 days a week. However, doesn't think will need to take Adderall every day. Pt states has been without all medications for past 4 days. Reviewed PmHx, RxHx, FmHx, SocHx, AllgHx and updated and dated in the chart. Review of Systems - negative except as listed above in the HPI    Objective:     Vitals:    06/11/18 1607   BP: 118/74   Pulse: 67   Resp: 16   Temp: 98.2 °F (36.8 °C)   TempSrc: Oral   SpO2: 100%   Weight: 147 lb (66.7 kg)   Height: 5' 6\" (1.676 m)     Physical Examination: General appearance - alert, well appearing, and in no distress  Mental status - alert, oriented to person, place, and time, normal mood, behavior, speech, dress, motor activity, and thought processes  Chest - clear to auscultation, no wheezes, rales or rhonchi, symmetric air entry  Heart - normal rate, regular rhythm, normal S1, S2, no murmurs, rubs, clicks or gallops    Assessment/ Plan:   Diagnoses and all orders for this visit:    1. Attention deficit disorder (ADD) without hyperactivity  -     COMPREHENSIVE DRUG ANALYSIS,UR  -     methylphenidate ER 54 mg 24 hr tab; Take 1 Tab (54 mg total) by mouth daily. Max Daily Amount: 54 mg  -     methylphenidate ER 54 mg 24 hr tab; Take 1 Tab (54 mg total) by mouth every morningEarliest Fill Date: 7/11/18. Max Daily Amount: 54 mg  -     methylphenidate ER 54 mg 24 hr tab; Take 1 Tab (54 mg total) by mouth dailyEarliest Fill Date: 8/10/18. Take 1 po qam Max Daily Amount: 54 mg  -     dextroamphetamine-amphetamine (ADDERALL) 10 mg tablet; Take 1 Tab (10 mg total) by mouth daily.   For use in afternoon as needed, in addition to Concerta daily Max Daily Amount: 10 mg  -     dextroamphetamine-amphetamine (ADDERALL) 10 mg tablet; Take 1 Tab (10 mg total) by mouth dailyEarliest Fill Date: 7/11/18. For use in afternoon as needed Max Daily Amount: 10 mg  Reviewed , pt taking appropriately. Refills provided. F/U 3 mo. Pt voiced understanding regarding plan of care. Follow-up Disposition:  Return in about 3 months (around 9/11/2018) for ADD refills . I have discussed the diagnosis with the patient and the intended plan as seen in the above orders. The patient has received an after-visit summary and questions were answered concerning future plans.      Medication Side Effects and Warnings were discussed with patient      Pradip Grady NP

## 2018-06-11 NOTE — PATIENT INSTRUCTIONS
Attention Deficit Hyperactivity Disorder (ADHD) in Adults: Care Instructions  Your Care Instructions    Attention deficit hyperactivity disorder, or ADHD, is a condition that makes it hard to pay attention. So you may have problems when you try to focus, get organized, and finish tasks. It might make you more active than other people. Or you might do things without thinking first.  ADHD is very common. It usually starts in early childhood. Many adults don't realize they have it until their children are diagnosed. Then they become aware of their own symptoms. Doctors don't know what causes ADHD. But it often runs in families. ADHD can be treated with medicines, behavior training, and counseling. Treatment can improve your life. Follow-up care is a key part of your treatment and safety. Be sure to make and go to all appointments, and call your doctor if you are having problems. It's also a good idea to know your test results and keep a list of the medicines you take. How can you care for yourself at home? · Learn all you can about ADHD. This will help you and your family understand it better. · Take your medicines exactly as prescribed. Call your doctor if you think you are having a problem with your medicine. You will get more details on the specific medicines your doctor prescribes. · If you miss a dose of your medicine, do not take an extra dose. · If your doctor suggests counseling, find a counselor you like and trust. Talk openly and honestly. Be willing to make some changes. · Find a support group for adults with ADHD. Talking to others with the same problems can help you feel better. It can also give you ideas about how to best cope with the condition. · Get rid of distractions at your work space. Keep your desk clean. Try not to face a window or busy hallway. · Use files, planners, and other tools to keep you organized. · Limit use of alcohol, and do not use illegal drugs.  People with ADHD tend to become addicted more easily than others. Tell your doctor if you need help to quit. Counseling, support groups, and sometimes medicines can help you stay free of alcohol or drugs. · Get at least 30 minutes of physical activity on most days of the week. Exercise has been shown to help people cope with ADHD. Walking is a good choice. You also may want to do other activities, such as running, swimming, cycling, or playing tennis or team sports. When should you call for help? Watch closely for changes in your health, and be sure to contact your doctor if:  ? · You feel sad a lot or cry all the time. ? · You have trouble sleeping, or you sleep too much. ? · You find it hard to concentrate, make decisions, or remember things. ? · You change how you normally eat. ? · You feel guilty for no reason. Where can you learn more? Go to http://mekhi-kady.info/. Enter B196 in the search box to learn more about \"Attention Deficit Hyperactivity Disorder (ADHD) in Adults: Care Instructions. \"  Current as of: May 12, 2017  Content Version: 11.4  © 8806-8791 Healthwise, Incorporated. Care instructions adapted under license by [employer] (which disclaims liability or warranty for this information). If you have questions about a medical condition or this instruction, always ask your healthcare professional. [address] any warranty or liability for your use of this information.

## 2018-06-11 NOTE — MR AVS SNAPSHOT
[address] [address] 
[phone] Patient: Brissa Cook MRN: [id number] :1995 Visit Information Date & Time Provider Department Dept. Phone Encounter #  
 2018  4:00 PM Cintia Deleon NP 17[address] [facility] [phone] [id number] Follow-up Instructions Return in about 3 months (around 2018) for ADD refills . Upcoming Health Maintenance Date Due Influenza Age 5 to Adult 2018 DTaP/Tdap/Td series (2 - Td) 2026 Allergies as of 2018  Review Complete On: 2018 By: Parvin Lundberg LPN No Known Allergies Current Immunizations  Reviewed on 2016 No immunizations on file. Not reviewed this visit You Were Diagnosed With   
  
 Codes Comments Attention deficit disorder (ADD) without hyperactivity    -  Primary ICD-10-CM: F98.8 ICD-9-CM: 314.00 Vitals BP Pulse Temp Resp Height(growth percentile) Weight(growth percentile) 118/74 67 98.2 °F (36.8 °C) (Oral) 16 5' 6\" (1.676 m) 147 lb (66.7 kg) SpO2 BMI Smoking Status 100% 23.73 kg/m2 Never Smoker BMI and BSA Data Body Mass Index Body Surface Area  
 23.73 kg/m 2 1.76 m 2 Preferred Pharmacy Pharmacy Name Phone [address] [place] [address] AT [facility] OF  [facility] [phone] Your Updated Medication List  
  
   
This list is accurate as of 18  4:18 PM.  Always use your most recent med list.  
  
  
  
  
 * dextroamphetamine-amphetamine 10 mg tablet Commonly known as:  ADDERALL Take 1 Tab (10 mg total) by mouth daily. For use in afternoon as needed, in addition to Concerta daily Max Daily Amount: 10 mg  
  
 * dextroamphetamine-amphetamine 10 mg tablet Commonly known as:  ADDERALL Take 1 Tab (10 mg total) by mouth dailyEarliest Fill Date: 18.   For use in afternoon as needed Max Daily Amount: 10 mg  
Start taking on:  7/11/2018 * methylphenidate HCl 54 mg CR tablet Commonly known as:  CONCERTA Take 1 Tab (54 mg total) by mouth daily. Max Daily Amount: 54 mg  
  
 * methylphenidate HCl 54 mg CR tablet Commonly known as:  CONCERTA Take 1 Tab (54 mg total) by mouth every morningEarliest Fill Date: 7/11/18. Max Daily Amount: 54 mg Start taking on:  7/11/2018 * methylphenidate HCl 54 mg CR tablet Commonly known as:  CONCERTA Take 1 Tab (54 mg total) by mouth dailyEarliest Fill Date: 8/10/18. Take 1 po qam Max Daily Amount: 54 mg Start taking on:  8/10/2018 * Notice: This list has 5 medication(s) that are the same as other medications prescribed for you. Read the directions carefully, and ask your doctor or other care provider to review them with you. Prescriptions Printed Refills  
 methylphenidate ER 54 mg 24 hr tab 0 Sig: Take 1 Tab (54 mg total) by mouth daily. Max Daily Amount: 54 mg Class: Print Route: Oral  
 methylphenidate ER 54 mg 24 hr tab 0 Starting on: 7/11/2018 Sig: Take 1 Tab (54 mg total) by mouth every morningEarliest Fill Date: 7/11/18. Max Daily Amount: 54 mg Class: Print Route: Oral  
 methylphenidate ER 54 mg 24 hr tab 0 Starting on: 8/10/2018 Sig: Take 1 Tab (54 mg total) by mouth dailyEarliest Fill Date: 8/10/18. Take 1 po qam Max Daily Amount: 54 mg Class: Print Route: Oral  
 dextroamphetamine-amphetamine (ADDERALL) 10 mg tablet 0 Sig: Take 1 Tab (10 mg total) by mouth daily. For use in afternoon as needed, in addition to Concerta daily Max Daily Amount: 10 mg  
 Class: Print Route: Oral  
 dextroamphetamine-amphetamine (ADDERALL) 10 mg tablet 0 Starting on: 7/11/2018 Sig: Take 1 Tab (10 mg total) by mouth dailyEarliest Fill Date: 7/11/18. For use in afternoon as needed Max Daily Amount: 10 mg  
 Class: Print  Route: Oral  
  
 We Performed the Following COMPREHENSIVE DRUG ANALYSIS,UR [[id number] Custom] Follow-up Instructions Return in about 3 months (around 9/11/2018) for ADD refills . Patient Instructions Attention Deficit Hyperactivity Disorder (ADHD) in Adults: Care Instructions Your Care Instructions Attention deficit hyperactivity disorder, or ADHD, is a condition that makes it hard to pay attention. So you may have problems when you try to focus, get organized, and finish tasks. It might make you more active than other people. Or you might do things without thinking first. 
ADHD is very common. It usually starts in early childhood. Many adults don't realize they have it until their children are diagnosed. Then they become aware of their own symptoms. Doctors don't know what causes ADHD. But it often runs in families. ADHD can be treated with medicines, behavior training, and counseling. Treatment can improve your life. Follow-up care is a key part of your treatment and safety. Be sure to make and go to all appointments, and call your doctor if you are having problems. It's also a good idea to know your test results and keep a list of the medicines you take. How can you care for yourself at home? · Learn all you can about ADHD. This will help you and your family understand it better. · Take your medicines exactly as prescribed. Call your doctor if you think you are having a problem with your medicine. You will get more details on the specific medicines your doctor prescribes. · If you miss a dose of your medicine, do not take an extra dose. · If your doctor suggests counseling, find a counselor you like and trust. Talk openly and honestly. Be willing to make some changes. · Find a support group for adults with ADHD. Talking to others with the same problems can help you feel better. It can also give you ideas about how to best cope with the condition. · Get rid of distractions at your work space. Keep your desk clean. Try not to face a window or busy hallway. · Use files, planners, and other tools to keep you organized. · Limit use of alcohol, and do not use illegal drugs. People with ADHD tend to become addicted more easily than others. Tell your doctor if you need help to quit. Counseling, support groups, and sometimes medicines can help you stay free of alcohol or drugs. · Get at least 30 minutes of physical activity on most days of the week. Exercise has been shown to help people cope with ADHD. Walking is a good choice. You also may want to do other activities, such as running, swimming, cycling, or playing tennis or team sports. When should you call for help? Watch closely for changes in your health, and be sure to contact your doctor if: 
? · You feel sad a lot or cry all the time. ? · You have trouble sleeping, or you sleep too much. ? · You find it hard to concentrate, make decisions, or remember things. ? · You change how you normally eat. ? · You feel guilty for no reason. Where can you learn more? Go to http://mekhi-kady.info/. Enter B196 in the search box to learn more about \"Attention Deficit Hyperactivity Disorder (ADHD) in Adults: Care Instructions. \" Current as of: May 12, 2017 Content Version: 11.4 © 2033-4576 Healthwise, Incorporated. Care instructions adapted under license by [employer] (which disclaims liability or warranty for this information). If you have questions about a medical condition or this instruction, always ask your healthcare professional. [address] [address] any warranty or liability for your use of this information. Introducing [facility] & HEALTH SERVICES! Dear Leodan Veliz: Thank you for requesting a [employer] account. Our records indicate that you already have an active [employer] account. You can access your account anytime at [email or web address] Did you know that you can access your hospital and ER discharge instructions at any time in [employer]? You can also review all of your test results from your hospital stay or ER visit. Additional Information If you have questions, please visit the Frequently Asked Questions section of the [employer] website at [email or web address] Remember, [employer] is NOT to be used for urgent needs. For medical emergencies, dial 911. Now available from your iPhone and Android! Please provide this summary of care documentation to your next provider. Your primary care clinician is listed as GURPREET LANGE. If you have any questions after today's visit, please call [phone].

## 2018-06-15 LAB — DRUGS UR: NORMAL

## 2018-06-18 NOTE — PROGRESS NOTES
Negative for medication as expected since pt had run out   Positive for THC but very low, will give pt warning

## 2018-08-22 ENCOUNTER — OFFICE VISIT (OUTPATIENT)
Dept: FAMILY MEDICINE CLINIC | Age: 23
End: 2018-08-22

## 2018-08-22 VITALS
HEIGHT: 66 IN | OXYGEN SATURATION: 99 % | BODY MASS INDEX: 24.43 KG/M2 | RESPIRATION RATE: 16 BRPM | DIASTOLIC BLOOD PRESSURE: 73 MMHG | HEART RATE: 70 BPM | WEIGHT: 152 LBS | TEMPERATURE: 98.5 F | SYSTOLIC BLOOD PRESSURE: 126 MMHG

## 2018-08-22 DIAGNOSIS — F51.3 SLEEP WALKING: Primary | ICD-10-CM

## 2018-08-22 DIAGNOSIS — R32 URINARY INCONTINENCE, UNSPECIFIED TYPE: ICD-10-CM

## 2018-08-22 DIAGNOSIS — R35.1 NOCTURIA: ICD-10-CM

## 2018-08-22 LAB
BILIRUB UR QL STRIP: NEGATIVE
GLUCOSE UR-MCNC: NEGATIVE MG/DL
KETONES P FAST UR STRIP-MCNC: NEGATIVE MG/DL
PH UR STRIP: 7 [PH] (ref 4.6–8)
PROT UR QL STRIP: NORMAL
SP GR UR STRIP: 1.02 (ref 1–1.03)
UA UROBILINOGEN AMB POC: NORMAL (ref 0.2–1)
URINALYSIS CLARITY POC: CLEAR
URINALYSIS COLOR POC: YELLOW
URINE BLOOD POC: NEGATIVE
URINE LEUKOCYTES POC: NEGATIVE
URINE NITRITES POC: NEGATIVE

## 2018-08-22 NOTE — PATIENT INSTRUCTIONS

## 2018-08-22 NOTE — PROGRESS NOTES
1. Have you been to the ER, urgent care clinic since your last visit? Hospitalized since your last visit? No    2. Have you seen or consulted any other health care providers outside of the [facility] since your last visit? Include any pap smears or colon screening.  No     Chief Complaint   Patient presents with    Sleep Problem     Sleep Walking, x3 months

## 2018-08-22 NOTE — PROGRESS NOTES
Chief Complaint   Patient presents with    Sleep Problem     Sleep Walking, x3 months     he is a 20y.o. year old male who presents for evalution. Pt states has been sleeping walking for past few months. Typically goes to sleep between 9-11pm, mother-in-law has noticed pt gets up and is walking around house, opening doors and baby cowan - usually after midnight and before 2-3am.   Will always get back into bed and wakes up normally with wife. Does not wake up feeling tired. Also having urinary incontinence that started about the same time. Has happened 4 times. Does wake pt up after it happens but not aware of it normally. Has stopped taking Concerta and Adderall since when went to fill after last visit had to pay cash and was very expensive so has not been taking. Pt states since he was a child has had vivid dream and wakes up repeatedly throughout night time. No snoring, no waking up gasping for air. Wife reports pt is sleep talker and makes a lot of noises - no change there. Pt does have a lot of stress, still working full time and going to school in evening. Also has child at home, living with wife's family. Reviewed PmHx, RxHx, FmHx, SocHx, AllgHx and updated and dated in the chart.     Review of Systems - negative except as listed above in the HPI    Objective:     Vitals:    08/22/18 1612   BP: 126/73   Pulse: 70   Resp: 16   Temp: 98.5 °F (36.9 °C)   TempSrc: Oral   SpO2: 99%   Weight: 152 lb (68.9 kg)   Height: 5' 6\" (1.676 m)     Physical Examination: General appearance - alert, well appearing, and in no distress  Mental status - alert, oriented to person, place, and time, normal mood, behavior, speech, dress, motor activity, and thought processes  Chest - clear to auscultation, no wheezes, rales or rhonchi, symmetric air entry  Heart - normal rate, regular rhythm, normal S1, S2, no murmurs, rubs, clicks or gallops  Neurological - alert, oriented, normal speech, no focal findings or movement disorder noted, motor and sensory grossly normal bilaterally    Assessment/ Plan:   Diagnoses and all orders for this visit:    1. Sleep walking  -     CBC WITH AUTOMATED DIFF  -     TSH 3RD GENERATION  -     METABOLIC PANEL, COMPREHENSIVE  -     HEMOGLOBIN A1C WITH EAG  -     REFERRAL TO SLEEP STUDIES  Unclear etiology. Work on sleep hygiene and minimizing stress. F/U with sleep specialist for further evaluation. 2. Urinary incontinence, unspecified type  -     CBC WITH AUTOMATED DIFF  -     METABOLIC PANEL, COMPREHENSIVE  -     HEMOGLOBIN A1C WITH EAG  -     AMB POC URINALYSIS DIP STICK AUTO W/O MICRO  Unclear etiology, labs pending. No signs of infection on dipstick. 3. Nocturia  -     AMB POC URINALYSIS DIP STICK AUTO W/O MICRO    Pt voiced understanding regarding plan of care. Follow-up Disposition:  Return if symptoms worsen or fail to improve. I have discussed the diagnosis with the patient and the intended plan as seen in the above orders. The patient has received an after-visit summary and questions were answered concerning future plans.      Medication Side Effects and Warnings were discussed with patient    Neo Sanders NP

## 2018-08-22 NOTE — MR AVS SNAPSHOT
[address] [address] 
[phone] Patient: Keri Wise MRN: [id number] :[id number] Visit Information Date & Time Provider Department Dept. Phone Encounter #  
 2018  4:00 PM Rony Phillips NP [address] [facility] [phone] [id number] Follow-up Instructions Return if symptoms worsen or fail to improve. Upcoming Health Maintenance Date Due Influenza Age 5 to Adult 2018 DTaP/Tdap/Td series (2 - Td) 2026 Allergies as of 2018  Review Complete On: 2018 By: Rony Phillips NP No Known Allergies Current Immunizations  Reviewed on 2016 No immunizations on file. Not reviewed this visit You Were Diagnosed With   
  
 Codes Comments Sleep walking    -  Primary ICD-10-CM: F51.3 ICD-9-CM: 307.46 Urinary incontinence, unspecified type     ICD-10-CM: R32 
ICD-9-CM: 788.30 Nocturia     ICD-10-CM: R35.1 ICD-9-CM: 788.43 Vitals BP Pulse Temp Resp Height(growth percentile) Weight(growth percentile) 126/73 70 98.5 °F (36.9 °C) (Oral) 16 5' 6\" (1.676 m) 152 lb (68.9 kg) SpO2 BMI Smoking Status 99% 24.53 kg/m2 Never Smoker Vitals History BMI and BSA Data Body Mass Index Body Surface Area 24.53 kg/m 2 1.79 m 2 Preferred Pharmacy Pharmacy Name Phone [facility], [address] AT [facility] OF  [facility] [phone] Your Updated Medication List  
  
   
This list is accurate as of 18  4:35 PM.  Always use your most recent med list.  
  
  
  
  
 * dextroamphetamine-amphetamine 10 mg tablet Commonly known as:  ADDERALL Take 1 Tab (10 mg total) by mouth daily. For use in afternoon as needed, in addition to Concerta daily Max Daily Amount: 10 mg  
  
 * dextroamphetamine-amphetamine 10 mg tablet Commonly known as:  ADDERALL Take 1 Tab (10 mg total) by mouth dailyEarliest Fill Date: 7/11/18. For use in afternoon as needed Max Daily Amount: 10 mg  
  
 * methylphenidate HCl 54 mg CR tablet Commonly known as:  CONCERTA Take 1 Tab (54 mg total) by mouth daily. Max Daily Amount: 54 mg  
  
 * methylphenidate HCl 54 mg CR tablet Commonly known as:  CONCERTA Take 1 Tab (54 mg total) by mouth every morningEarliest Fill Date: 7/11/18. Max Daily Amount: 54 mg  
  
 * methylphenidate HCl 54 mg CR tablet Commonly known as:  CONCERTA Take 1 Tab (54 mg total) by mouth dailyEarliest Fill Date: 8/10/18. Take 1 po qam Max Daily Amount: 54 mg * Notice: This list has 5 medication(s) that are the same as other medications prescribed for you. Read the directions carefully, and ask your doctor or other care provider to review them with you. We Performed the Following CBC WITH AUTOMATED DIFF [23576 CPT(R)] HEMOGLOBIN A1C WITH EAG [00952 CPT(R)] METABOLIC PANEL, COMPREHENSIVE [94881 CPT(R)] REFERRAL TO SLEEP STUDIES [REF99 Custom] TSH 3RD GENERATION [82807 CPT(R)] Follow-up Instructions Return if symptoms worsen or fail to improve. Referral Information Referral ID Referred By Referred To  
  
 [id number] Jean Pierre RESENDIZ MD [address]  Phone: [phone] Fax: [phone] Visits Status Start Date End Date 1 New Request 8/22/18 8/22/19 If your referral has a status of pending review or denied, additional information will be sent to support the outcome of this decision. Patient Instructions Learning About Sleeping Well What does sleeping well mean? Sleeping well means getting enough sleep. How much sleep is enough varies among people. The number of hours you sleep is not as important as how you feel when you wake up. If you do not feel refreshed, you probably need more sleep. Another sign of not getting enough sleep is feeling tired during the day. The average total nightly sleep time is 7½ to 8 hours. Healthy adults may need a little more or a little less than this. Why is getting enough sleep important? Getting enough quality sleep is a basic part of good health. When your sleep suffers, your mood and your thoughts can suffer too. You may find yourself feeling more grumpy or stressed. Not getting enough sleep also can lead to serious problems, including injury, accidents, anxiety, and depression. What might cause poor sleeping? Many things can cause sleep problems, including: · Stress. Stress can be caused by fear about a single event, such as giving a speech. Or you may have ongoing stress, such as worry about work or school. · Depression, anxiety, and other mental or emotional conditions. · Changes in your sleep habits or surroundings. This includes changes that happen where you sleep, such as noise, light, or sleeping in a different bed. It also includes changes in your sleep pattern, such as having jet lag or working a late shift. · Health problems, such as pain, breathing problems, and restless legs syndrome. · Lack of regular exercise. How can you help yourself? Here are some tips that may help you sleep more soundly and wake up feeling more refreshed. Your sleeping area · Use your bedroom only for sleeping and sex. A bit of light reading may help you fall asleep. But if it doesn't, do your reading elsewhere in the house. Don't watch TV in bed. · Be sure your bed is big enough to stretch out comfortably, especially if you have a sleep partner. · Keep your bedroom quiet, dark, and cool. Use curtains, blinds, or a sleep mask to block out light. To block out noise, use earplugs, soothing music, or a \"white noise\" machine. Your evening and bedtime routine · Create a relaxing bedtime routine.  You might want to take a warm shower or bath, listen to soothing music, or drink a cup of noncaffeinated tea. · Go to bed at the same time every night. And get up at the same time every morning, even if you feel tired. What to avoid · Limit caffeine (coffee, tea, caffeinated sodas) during the day, and don't have any for at least 4 to 6 hours before bedtime. · Don't drink alcohol before bedtime. Alcohol can cause you to wake up more often during the night. · Don't smoke or use tobacco, especially in the evening. Nicotine can keep you awake. · Don't take naps during the day, especially close to bedtime. · Don't lie in bed awake for too long. If you can't fall asleep, or if you wake up in the middle of the night and can't get back to sleep within 15 minutes or so, get out of bed and go to another room until you feel sleepy. · Don't take medicine right before bed that may keep you awake or make you feel hyper or energized. Your doctor can tell you if your medicine may do this and if you can take it earlier in the day. If you can't sleep · Imagine yourself in a peaceful, pleasant scene. Focus on the details and feelings of being in a place that is relaxing. · Get up and do a quiet or boring activity until you feel sleepy. · Don't drink any liquids after 6 p.m. if you wake up often because you have to go to the bathroom. Where can you learn more? Go to http://mekhi-kady.info/. Enter A408 in the search box to learn more about \"Learning About Sleeping Well. \" Current as of: December 7, 2017 Content Version: 11.7 © 8379-6116 [employer] [place]. Care instructions adapted under license by [employer] (which disclaims liability or warranty for this information). If you have questions about a medical condition or this instruction, always ask your healthcare professional. [address] any warranty or liability for your use of this information. Introducing [facility]! Dear Aron Gastelum: Thank you for requesting a [facility] account. Our records indicate that you already have an active [facility] account. You can access your account anytime at [email or web address] Did you know that you can access your hospital and ER discharge instructions at any time in [facility]? You can also review all of your test results from your hospital stay or ER visit. Additional Information If you have questions, please visit the Frequently Asked Questions section of the [facility] website at [email or web address] Remember, [facility] is NOT to be used for urgent needs. For medical emergencies, dial 911. Now available from your iPhone and Android! Please provide this summary of care documentation to your next provider. Your primary care clinician is listed as GURPREET LANGE. If you have any questions after today's visit, please call [phone].

## 2018-08-23 ENCOUNTER — TELEPHONE (OUTPATIENT)
Dept: SLEEP MEDICINE | Age: 23
End: 2018-08-23

## 2018-08-23 LAB
ALBUMIN SERPL-MCNC: 5 G/DL (ref 3.5–5.5)
ALBUMIN/GLOB SERPL: 2.1 {RATIO} (ref 1.2–2.2)
ALP SERPL-CCNC: 53 IU/L (ref 39–117)
ALT SERPL-CCNC: 31 IU/L (ref 0–44)
AST SERPL-CCNC: 29 IU/L (ref 0–40)
BASOPHILS # BLD AUTO: 0 X10E3/UL (ref 0–0.2)
BASOPHILS NFR BLD AUTO: 1 %
BILIRUB SERPL-MCNC: 0.4 MG/DL (ref 0–1.2)
BUN SERPL-MCNC: 15 MG/DL (ref 6–20)
BUN/CREAT SERPL: 16 (ref 9–20)
CALCIUM SERPL-MCNC: 9.7 MG/DL (ref 8.7–10.2)
CHLORIDE SERPL-SCNC: 101 MMOL/L (ref 96–106)
CO2 SERPL-SCNC: 23 MMOL/L (ref 20–29)
CREAT SERPL-MCNC: 0.92 MG/DL (ref 0.76–1.27)
EOSINOPHIL # BLD AUTO: 0.2 X10E3/UL (ref 0–0.4)
EOSINOPHIL NFR BLD AUTO: 3 %
ERYTHROCYTE [DISTWIDTH] IN BLOOD BY AUTOMATED COUNT: 13.3 % (ref 12.3–15.4)
EST. AVERAGE GLUCOSE BLD GHB EST-MCNC: 103 MG/DL
GLOBULIN SER CALC-MCNC: 2.4 G/DL (ref 1.5–4.5)
GLUCOSE SERPL-MCNC: 96 MG/DL (ref 65–99)
HBA1C MFR BLD: 5.2 % (ref 4.8–5.6)
HCT VFR BLD AUTO: 42.4 % (ref 37.5–51)
HGB BLD-MCNC: 14.1 G/DL (ref 13–17.7)
IMM GRANULOCYTES # BLD: 0 X10E3/UL (ref 0–0.1)
IMM GRANULOCYTES NFR BLD: 0 %
LYMPHOCYTES # BLD AUTO: 2.2 X10E3/UL (ref 0.7–3.1)
LYMPHOCYTES NFR BLD AUTO: 38 %
MCH RBC QN AUTO: 27.2 PG (ref 26.6–33)
MCHC RBC AUTO-ENTMCNC: 33.3 G/DL (ref 31.5–35.7)
MCV RBC AUTO: 82 FL (ref 79–97)
MONOCYTES # BLD AUTO: 0.6 X10E3/UL (ref 0.1–0.9)
MONOCYTES NFR BLD AUTO: 10 %
NEUTROPHILS # BLD AUTO: 2.8 X10E3/UL (ref 1.4–7)
NEUTROPHILS NFR BLD AUTO: 48 %
PLATELET # BLD AUTO: 177 X10E3/UL (ref 150–379)
POTASSIUM SERPL-SCNC: 4 MMOL/L (ref 3.5–5.2)
PROT SERPL-MCNC: 7.4 G/DL (ref 6–8.5)
RBC # BLD AUTO: 5.18 X10E6/UL (ref 4.14–5.8)
SODIUM SERPL-SCNC: 141 MMOL/L (ref 134–144)
TSH SERPL DL<=0.005 MIU/L-ACNC: 1.56 UIU/ML (ref 0.45–4.5)
WBC # BLD AUTO: 5.8 X10E3/UL (ref 3.4–10.8)

## 2019-11-13 NOTE — PATIENT INSTRUCTIONS
Attention Deficit Hyperactivity Disorder (ADHD) in Adults: Care Instructions  Your Care Instructions  Attention deficit hyperactivity disorder, or ADHD, is a condition that makes it hard to pay attention. So you may have problems when you try to focus, get organized, and finish tasks. It might make you more active than other people. Or you might do things without thinking first.  ADHD is very common. It usually starts in early childhood. Many adults don't realize they have it until their children are diagnosed. Then they become aware of their own symptoms. Doctors don't know what causes ADHD. But it often runs in families. ADHD can be treated with medicines, behavior training, and counseling. Treatment can improve your life. Follow-up care is a key part of your treatment and safety. Be sure to make and go to all appointments, and call your doctor if you are having problems. It's also a good idea to know your test results and keep a list of the medicines you take. How can you care for yourself at home? · Learn all you can about ADHD. This will help you and your family understand it better. · Take your medicines exactly as prescribed. Call your doctor if you think you are having a problem with your medicine. You will get more details on the specific medicines your doctor prescribes. · If you miss a dose of your medicine, do not take an extra dose. · If your doctor suggests counseling, find a counselor you like and trust. Talk openly and honestly. Be willing to make some changes. · Find a support group for adults with ADHD. Talking to others with the same problems can help you feel better. It can also give you ideas about how to best cope with the condition. · Get rid of distractions at your work space. Keep your desk clean. Try not to face a window or busy hallway. · Use files, planners, and other tools to keep you organized. · Limit use of alcohol, and do not use illegal drugs.  People with ADHD tend Pt returning call. Transferring to triage.   to become addicted more easily than others. Tell your doctor if you need help to quit. Counseling, support groups, and sometimes medicines can help you stay free of alcohol or drugs. · Get at least 30 minutes of physical activity on most days of the week. Exercise has been shown to help people cope with ADHD. Walking is a good choice. You also may want to do other activities, such as running, swimming, cycling, or playing tennis or team sports. When should you call for help? Watch closely for changes in your health, and be sure to contact your doctor if:  · You feel sad a lot or cry all the time. · You have trouble sleeping, or you sleep too much. · You find it hard to concentrate, make decisions, or remember things. · You change how you normally eat. · You feel guilty for no reason. Where can you learn more? Go to http://mekhi-kady.info/. Enter B196 in the search box to learn more about \"Attention Deficit Hyperactivity Disorder (ADHD) in Adults: Care Instructions. \"  Current as of: July 26, 2016  Content Version: 11.1  © 4839-2973 [employer]. Care instructions adapted under license by [employer] (which disclaims liability or warranty for this information). If you have questions about a medical condition or this instruction, always ask your healthcare professional. [address] any warranty or liability for your use of this information.

## 2022-02-11 ENCOUNTER — OFFICE VISIT (OUTPATIENT)
Dept: FAMILY MEDICINE CLINIC | Age: 27
End: 2022-02-11
Payer: COMMERCIAL

## 2022-02-11 VITALS
SYSTOLIC BLOOD PRESSURE: 118 MMHG | TEMPERATURE: 98.1 F | BODY MASS INDEX: 26.84 KG/M2 | HEIGHT: 66 IN | OXYGEN SATURATION: 100 % | DIASTOLIC BLOOD PRESSURE: 76 MMHG | HEART RATE: 66 BPM | RESPIRATION RATE: 18 BRPM | WEIGHT: 167 LBS

## 2022-02-11 DIAGNOSIS — F41.9 ANXIETY: ICD-10-CM

## 2022-02-11 DIAGNOSIS — F98.8 ATTENTION DEFICIT DISORDER (ADD) WITHOUT HYPERACTIVITY: Primary | ICD-10-CM

## 2022-02-11 LAB
AMPHETAMINE QL URINE POC: NEGATIVE
BARBITURATES UR POC: NEGATIVE
BENZODIAZEPINES UR POC: NEGATIVE
COCAINE QL URINE POC: NEGATIVE
LOT EXP DATE POC: NORMAL
LOT NUMBER POC: NORMAL
MARIJUANA (THC) QL URINE POC: NEGATIVE
MDMA/ECSTASY UR POC: NEGATIVE
METHADONE QL URINE POC: NORMAL
METHAMPHETAMINE QL URINE POC: NEGATIVE
NTI OTHER MICRO TRANSPORT 977598: NORMAL
OPIATES QL URINE POC: NEGATIVE
OXYCODONE UR POC: NEGATIVE
VALID INTERNAL CONTROL?: YES

## 2022-02-11 PROCEDURE — 99213 OFFICE O/P EST LOW 20 MIN: CPT | Performed by: NURSE PRACTITIONER

## 2022-02-11 PROCEDURE — 80305 DRUG TEST PRSMV DIR OPT OBS: CPT | Performed by: NURSE PRACTITIONER

## 2022-02-11 RX ORDER — METHYLPHENIDATE HYDROCHLORIDE 54 MG/1
54 TABLET ORAL DAILY
Qty: 30 TABLET | Refills: 0 | Status: SHIPPED | OUTPATIENT
Start: 2022-02-11 | End: 2022-03-10

## 2022-02-11 NOTE — PROGRESS NOTES
Chief Complaint   Patient presents with    Attention Deficit Disorder    Medication Evaluation     Patient in office today to discuss restarting medication. Pt noted good results with Concerta-had to stop due to insurance coverage. Pt have recently had promotion in October and have noted lack of concentration and focus. Pt have noted an increase in anxiety since promotion. Thinking it's related to his ADHD. Pt have noted a sense of being overwhelmed. Pt have noted anxiety attack x1, that occurred last week. Lasted for 1-2hrs. Self coping mechanism helped resolve attack. Denies any previous testing as an adult, only in childhood. Pt has really been struggling with anxiety. Poor focus and not being able to think strait. Feels like the world is closing in. Overwhelmed. Can break down when it's severe. Denies any other concerns at this time. Chief Complaint   Patient presents with    Attention Deficit Disorder    Medication Evaluation    Anxiety     he is a 27y.o. year old male who presents for evalution. Reviewed PmHx, RxHx, FmHx, SocHx, AllgHx and updated and dated in the chart. Review of Systems - negative except as listed above in the HPI    Objective:     Vitals:    02/11/22 1611   BP: 118/76   Pulse: 66   Resp: 18   Temp: 98.1 °F (36.7 °C)   TempSrc: Oral   SpO2: 100%   Weight: 167 lb (75.8 kg)   Height: 5' 6\" (1.676 m)     Physical Examination: General appearance - alert, well appearing, and in no distress  Mental status - anxious but redirectable  Chest - clear to auscultation, no wheezes, rales or rhonchi, symmetric air entry  Heart - normal rate, regular rhythm, normal S1, S2, no murmurs    Assessment/ Plan:   Diagnoses and all orders for this visit:    1. Attention deficit disorder (ADD) without hyperactivity  -     methylphenidate ER 54 mg 24 hr tab; Take 1 Tablet by mouth daily.  Max Daily Amount: 54 mg.  -     AMB POC ALERE ICUP DX DRUG SCREEN 10  Resume concerta daily as prescribed. Reinforced SEs/ADRs of medication. Enc pt to reach out in 1 month with an update. We discussed that sx of anxiety could be provoked by untreated ADD. If anxiety sx persist or worsen consider starting a new daily med to address that issue separately. Also recommended pt est care with psych for formal evaluation and testing. 2. Anxiety       Follow-up and Dispositions    · Return in about 4 weeks (around 3/11/2022) for follow up. I have discussed the diagnosis with the patient and the intended plan as seen in the above orders. The patient has received an after-visit summary and questions were answered concerning future plans. Medication Side Effects and Warnings were discussed with patient: yes  Patient Labs were reviewed and or requested: yes  Patient Past Records were reviewed and or requested  yes  Patient / Caregiver Understanding of treatment plan was verbalized during office visit YES    TIKA JimenezP-C    Patient Instructions   [address]  [phone]    [address] [place] [address]. Marisol Menezes [facility], [facility] 23  [phone]    [email or web address]    [facility]  [address] [place] [address], [address]  [address] [place] [address]  [phone]  [email or web address]    [facility]  [facility]  [address], [address] [facility], [address]  [phone]    [address]  [address] [place] [address] 33  [phone]

## 2022-02-11 NOTE — PROGRESS NOTES
Chief Complaint   Patient presents with    Attention Deficit Disorder    Medication Evaluation     Patient in office today to discuss restarting medication. Pt noted good results with Concerta-had to stop due to insurance coverage. Pt have recently had promotion in October. Have noted lack of concentration and focus. Pt have noted an increase in anxiety since promotion. Pt have noted a sense of being overwhelmed. Pt have noted anxiety attack x1,that occurred last week. Lasted for 1-2hrs. Self coping mechanism helped resolve attack. 1. Have you been to the ER, urgent care clinic since your last visit? Hospitalized since your last visit? No    2. Have you seen or consulted any other health care providers outside of the [address] [place] [address] since your last visit? Include any pap smears or colon screening.  No

## 2022-02-11 NOTE — PATIENT INSTRUCTIONS
[address] [facility]  [address] [facility] 33  [phone]    [address] [facility]. [address], [facility]  [phone]    [email or web address]    [facility]  [address] [facility], [address]  [address] [facility]  [phone]  [email or web address]    [facility]  [facility]  [address], [address] [facility], [address] [place]  [phone]    [address]  [address] [address]  [phone]

## 2022-03-10 ENCOUNTER — OFFICE VISIT (OUTPATIENT)
Dept: FAMILY MEDICINE CLINIC | Age: 27
End: 2022-03-10
Payer: COMMERCIAL

## 2022-03-10 VITALS
TEMPERATURE: 98.5 F | HEIGHT: 66 IN | RESPIRATION RATE: 18 BRPM | HEART RATE: 83 BPM | BODY MASS INDEX: 25.88 KG/M2 | WEIGHT: 161 LBS | OXYGEN SATURATION: 100 % | SYSTOLIC BLOOD PRESSURE: 125 MMHG | DIASTOLIC BLOOD PRESSURE: 84 MMHG

## 2022-03-10 DIAGNOSIS — F98.8 ATTENTION DEFICIT DISORDER (ADD) WITHOUT HYPERACTIVITY: ICD-10-CM

## 2022-03-10 PROCEDURE — 99213 OFFICE O/P EST LOW 20 MIN: CPT | Performed by: NURSE PRACTITIONER

## 2022-03-10 RX ORDER — METHYLPHENIDATE HYDROCHLORIDE 72 MG/1
72 TABLET, EXTENDED RELEASE ORAL DAILY
Qty: 30 TABLET | Refills: 0 | Status: SHIPPED | OUTPATIENT
Start: 2022-03-10 | End: 2022-03-18

## 2022-03-10 NOTE — PROGRESS NOTES
Chief Complaint   Patient presents with    Behavioral Problem    Medication Evaluation     Patient in office today for med check. Medication has been [place] bit of a roller coaster. \"  Pt states 1st week-was working well but didn't have much of an appetite. Also noticed feeling exhausted between 2-5 pm.   2nd week-appetite improved but noted persistent fatigue. Felt depressed at times. 3rd week - slight improvement of side effects, \"started leveling out. \"  4th week- pt notes a highs and lows are now steady. Feels calm and level. Does not recall feeling this way last time he was on medication. Pt does note an improvement in concentration and focus. Less scatter brained and staying focused on what he's doing and staying on task. Denies any other concerns at this time. Chief Complaint   Patient presents with    Behavioral Problem    Medication Evaluation     he is a 27y.o. year old male who presents for evalution. Reviewed PmHx, RxHx, FmHx, SocHx, AllgHx and updated and dated in the chart. Review of Systems - negative except as listed above in the HPI    Objective:     Vitals:    03/10/22 1022   BP: 125/84   Pulse: 83   Resp: 18   Temp: 98.5 °F (36.9 °C)   TempSrc: Oral   SpO2: 100%   Weight: 161 lb (73 kg)   Height: 5' 6\" (1.676 m)     Physical Examination: General appearance - alert, well appearing, and in no distress  Mental status - normal mood, behavior  Chest - clear to auscultation, no wheezes, rales or rhonchi, symmetric air entry  Heart - normal rate, regular rhythm, normal S1, S2, no murmurs    Assessment/ Plan:   Diagnoses and all orders for this visit:    1. Attention deficit disorder (ADD) without hyperactivity  -     METHYLPHENIDATE ER 72 MG TABLET,EXTENDED RELEASE 24 HR; Take 72 mg by mouth daily. Max Daily Amount: 72 mg. Will try increasing dose to 72 mg to see if that improves his afternoon crash and fatigue.  Enc pt to reach out on [employer] [facility] with an update after starting new dose.        I have discussed the diagnosis with the patient and the intended plan as seen in the above orders. The patient has received an after-visit summary and questions were answered concerning future plans. Medication Side Effects and Warnings were discussed with patient: yes  Patient Labs were reviewed and or requested: no  Patient Past Records were reviewed and or requested  yes  Patient / Caregiver Understanding of treatment plan was verbalized during office visit YES    NUNO Jimenez    There are no Patient Instructions on file for this visit.

## 2022-03-10 NOTE — PROGRESS NOTES
Chief Complaint   Patient presents with    Behavioral Problem    Medication Evaluation     Patient in office today for med check. Pt states 1st week-was well. 2nd week-appetite improved but noted fatigue. 3rd week slight improved  4th week-pt notes a highs and lows are now steady. Pt does note an improvement in concentration and focus. 1. Have you been to the ER, urgent care clinic since your last visit? Hospitalized since your last visit? No    2. Have you seen or consulted any other health care providers outside of the [address] [place] [address] since your last visit? Include any pap smears or colon screening.  No

## 2022-03-18 ENCOUNTER — TELEPHONE (OUTPATIENT)
Dept: FAMILY MEDICINE CLINIC | Age: 27
End: 2022-03-18

## 2022-03-18 DIAGNOSIS — F98.8 ATTENTION DEFICIT DISORDER (ADD) WITHOUT HYPERACTIVITY: Primary | ICD-10-CM

## 2022-03-18 RX ORDER — DEXTROAMPHETAMINE SACCHARATE, AMPHETAMINE ASPARTATE, DEXTROAMPHETAMINE SULFATE AND AMPHETAMINE SULFATE 5; 5; 5; 5 MG/1; MG/1; MG/1; MG/1
20 TABLET ORAL 2 TIMES DAILY
Qty: 60 TABLET | Refills: 0 | Status: SHIPPED | OUTPATIENT
Start: 2022-03-18 | End: 2022-04-18 | Stop reason: SDUPTHER

## 2022-03-18 NOTE — TELEPHONE ENCOUNTER
Attempted to start PA via covermymeds  Request was sent with old insurance info from pharmacy-received error pt not in system. Nurse submitted with current insurance inf,[employer]-received error pt not in system. Have spoken with wife and advised of error messages and to call insurance and pharmacy to verify coverage and pharmacy is running rx through current insurance plan.

## 2022-03-18 NOTE — TELEPHONE ENCOUNTER
Please call Joanie/patient's wife as she stated insurance will pay if dosage is changed.  Joanie's phone: [phone]

## 2022-03-18 NOTE — TELEPHONE ENCOUNTER
Pts wife Richelle Duncan (on hippa) called  In and states insurance will not cover methylphenidate. Would you be able to call in something else for him please. Or call in the old rx until they can figure out something. Call back number for Richelle Duncan in case is [phone]. Thanks.

## 2022-03-18 NOTE — TELEPHONE ENCOUNTER
Wife was advised by insurance company if provider can write rx as old script but as bid instead, pharmacist thinks rx will go through. Advised can put in request to provider. Otherwise will have to wait for PA nurse to address on Monday.

## 2022-03-18 NOTE — TELEPHONE ENCOUNTER
ER medications are not dosed BID, that makes no sense. I'm not writing it that way. We can try to see what's preferred on formulary.

## 2022-04-18 DIAGNOSIS — F98.8 ATTENTION DEFICIT DISORDER (ADD) WITHOUT HYPERACTIVITY: ICD-10-CM

## 2022-04-19 RX ORDER — DEXTROAMPHETAMINE SACCHARATE, AMPHETAMINE ASPARTATE, DEXTROAMPHETAMINE SULFATE AND AMPHETAMINE SULFATE 5; 5; 5; 5 MG/1; MG/1; MG/1; MG/1
20 TABLET ORAL 2 TIMES DAILY
Qty: 60 TABLET | Refills: 0 | Status: SHIPPED | OUTPATIENT
Start: 2022-04-19 | End: 2022-05-18 | Stop reason: SDUPTHER

## 2022-05-18 DIAGNOSIS — F98.8 ATTENTION DEFICIT DISORDER (ADD) WITHOUT HYPERACTIVITY: ICD-10-CM

## 2022-05-19 RX ORDER — DEXTROAMPHETAMINE SACCHARATE, AMPHETAMINE ASPARTATE, DEXTROAMPHETAMINE SULFATE AND AMPHETAMINE SULFATE 5; 5; 5; 5 MG/1; MG/1; MG/1; MG/1
20 TABLET ORAL 2 TIMES DAILY
Qty: 60 TABLET | Refills: 0 | Status: SHIPPED | OUTPATIENT
Start: 2022-05-19 | End: 2022-07-12 | Stop reason: SDUPTHER

## 2022-06-15 ENCOUNTER — TELEPHONE (OUTPATIENT)
Dept: FAMILY MEDICINE CLINIC | Age: 27
End: 2022-06-15

## 2022-06-15 NOTE — TELEPHONE ENCOUNTER
Wife Karly Barrow states she thinks patient is depressed with emotional roller coaster & patient will not call himself. She thinks it is coming from [facility]. Can you change? What do you recommend?  Please call

## 2022-06-16 NOTE — TELEPHONE ENCOUNTER
Med check appt scheduled-advised next available appt with provider Pelon Medina is 7/5. Due to pt is out of add medication does not want to wait that long,scheduled for first available vv appt on Monday 6/20.

## 2022-06-20 ENCOUNTER — DOCUMENTATION ONLY (OUTPATIENT)
Dept: FAMILY MEDICINE CLINIC | Age: 27
End: 2022-06-20

## 2022-06-20 NOTE — PROGRESS NOTES
Tried to call pt and vm is full, called lvm on [place]  to call us back to see about moving him to an appt this morning with rivas (double book)

## 2022-06-20 NOTE — PROGRESS NOTES
Attempted to call patient regarding VV for this afternoon however no answer and MB is full.  Provider will not be available to apt and am seeing if we can move to this morning, otherwise will need to r/s

## 2022-07-12 ENCOUNTER — VIRTUAL VISIT (OUTPATIENT)
Dept: FAMILY MEDICINE CLINIC | Age: 27
End: 2022-07-12
Payer: COMMERCIAL

## 2022-07-12 DIAGNOSIS — F41.9 ANXIETY: ICD-10-CM

## 2022-07-12 DIAGNOSIS — F98.8 ATTENTION DEFICIT DISORDER (ADD) WITHOUT HYPERACTIVITY: Primary | ICD-10-CM

## 2022-07-12 PROCEDURE — 99214 OFFICE O/P EST MOD 30 MIN: CPT | Performed by: FAMILY MEDICINE

## 2022-07-12 RX ORDER — DEXTROAMPHETAMINE SACCHARATE, AMPHETAMINE ASPARTATE, DEXTROAMPHETAMINE SULFATE AND AMPHETAMINE SULFATE 5; 5; 5; 5 MG/1; MG/1; MG/1; MG/1
20 TABLET ORAL 2 TIMES DAILY
Qty: 60 TABLET | Refills: 0 | Status: SHIPPED | OUTPATIENT
Start: 2022-07-12 | End: 2022-08-11

## 2022-07-12 RX ORDER — DEXTROAMPHETAMINE SACCHARATE, AMPHETAMINE ASPARTATE, DEXTROAMPHETAMINE SULFATE AND AMPHETAMINE SULFATE 5; 5; 5; 5 MG/1; MG/1; MG/1; MG/1
20 TABLET ORAL 2 TIMES DAILY
Qty: 60 TABLET | Refills: 0 | Status: SHIPPED | OUTPATIENT
Start: 2022-08-11 | End: 2022-09-10

## 2022-07-12 RX ORDER — DEXTROAMPHETAMINE SACCHARATE, AMPHETAMINE ASPARTATE, DEXTROAMPHETAMINE SULFATE AND AMPHETAMINE SULFATE 5; 5; 5; 5 MG/1; MG/1; MG/1; MG/1
20 TABLET ORAL 2 TIMES DAILY
Qty: 60 TABLET | Refills: 0 | Status: SHIPPED | OUTPATIENT
Start: 2022-09-10 | End: 2022-10-10

## 2022-07-12 RX ORDER — SERTRALINE HYDROCHLORIDE 25 MG/1
25 TABLET, FILM COATED ORAL DAILY
Qty: 30 TABLET | Refills: 2 | Status: SHIPPED | OUTPATIENT
Start: 2022-07-12 | End: 2022-08-23 | Stop reason: SDUPTHER

## 2022-07-12 NOTE — PATIENT INSTRUCTIONS
A Healthy Lifestyle: Care Instructions  Your Care Instructions     A healthy lifestyle can help you feel good, stay at a healthy weight, and have plenty of energy for both work and play. A healthy lifestyle is something you can share with your whole family. A healthy lifestyle also can lower your risk for serious health problems, such as high blood pressure, heart disease, and diabetes. You can follow a few steps listed below to improve your health and the health of your family. Follow-up care is a key part of your treatment and safety. Be sure to make and go to all appointments, and call your doctor if you are having problems. It's also a good idea to know your test results and keep a list of the medicines you take. How can you care for yourself at home? · Do not eat too much sugar, fat, or fast foods. You can still have dessert and treats now and then. The goal is moderation. · Start small to improve your eating habits. Pay attention to portion sizes, drink less juice and soda pop, and eat more fruits and vegetables. ? Eat a healthy amount of food. A 3-ounce serving of meat, for example, is about the size of a deck of cards. Fill the rest of your plate with vegetables and whole grains. ? Limit the amount of soda and sports drinks you have every day. Drink more water when you are thirsty. ? Eat plenty of fruits and vegetables every day. Have an apple or some carrot sticks as an afternoon snack instead of a candy bar. Try to have fruits and/or vegetables at every meal.  · Make exercise part of your daily routine. You may want to start with simple activities, such as walking, bicycling, or slow swimming. Try to be active 30 to 60 minutes every day. You do not need to do all 30 to 60 minutes all at once. For example, you can exercise 3 times a day for 10 or 20 minutes.  Moderate exercise is safe for most people, but it is always a good idea to talk to your doctor before starting an exercise program.  · Keep moving. Derinda Leyden the lawn, work in the garden, or [place]. Take the stairs instead of the elevator at work. · If you smoke, quit. People who smoke have an increased risk for heart attack, stroke, cancer, and other lung illnesses. Quitting is hard, but there are ways to boost your chance of quitting tobacco for good. ? Use nicotine gum, patches, or lozenges. ? Ask your doctor about stop-smoking programs and medicines. ? Keep trying. In addition to reducing your risk of diseases in the future, you will notice some benefits soon after you stop using tobacco. If you have shortness of breath or asthma symptoms, they will likely get better within a few weeks after you quit. · Limit how much alcohol you drink. Moderate amounts of alcohol (up to 2 drinks a day for men, 1 drink a day for women) are okay. But drinking too much can lead to liver problems, high blood pressure, and other health problems. Family health  If you have a family, there are many things you can do together to improve your health. · Eat meals together as a family as often as possible. · Eat healthy foods. This includes fruits, vegetables, lean meats and dairy, and whole grains. · Include your family in your fitness plan. Most people think of activities such as jogging or tennis as the way to fitness, but there are many ways you and your family can be more active. Anything that makes you breathe hard and gets your heart pumping is exercise. Here are some tips:  ? Walk to do errands or to take your child to school or the bus.  ? Go for a family bike ride after dinner instead of watching TV. Where can you learn more? Go to http://www.gray.com/  Enter N219 in the search box to learn more about \"A Healthy Lifestyle: Care Instructions. \"  Current as of: June 16, 2021               Content Version: 13.2  © 2066-7323 [employer].    Care instructions adapted under license by [employer] (which disclaims liability or warranty for this information). If you have questions about a medical condition or this instruction, always ask your healthcare professional. [address] any warranty or liability for your use of this information.

## 2022-07-12 NOTE — PROGRESS NOTES
Progress Note    he is a 32y.o. year old male who presents for evalution. Subjective:     Patient here for refill of Adderall 20 mg taken once daily. This has helped with his attention deficit disorder. He is having some anxiety is not sure if it is directly related to the Adderall or not. He is interested in starting something specifically for the anxiety. He has not taken anything previously. His wife is currently on Zoloft as she discussed during the visit and states this works very well for her and tried suggested starting Zoloft. Reviewed PmHx, RxHx, FmHx, SocHx, AllgHx and updated and dated in the chart. Review of Systems - negative except as listed above in the HPI    Objective: There were no vitals filed for this visit. Current Outpatient Medications   Medication Sig    sertraline (ZOLOFT) 25 mg tablet Take 1 Tablet by mouth daily.  [START ON 9/10/2022] dextroamphetamine-amphetamine (ADDERALL) 20 mg tablet Take 1 Tablet by mouth two (2) times a day for 30 days. Max Daily Amount: 40 mg.    [START ON 8/11/2022] dextroamphetamine-amphetamine (ADDERALL) 20 mg tablet Take 1 Tablet by mouth two (2) times a day for 30 days. Max Daily Amount: 40 mg.    dextroamphetamine-amphetamine (ADDERALL) 20 mg tablet Take 1 Tablet by mouth two (2) times a day for 30 days. Max Daily Amount: 40 mg. No current facility-administered medications for this visit. Physical Examination: General appearance - alert, well appearing, and in no distress  Chest - clear to auscultation, no wheezes, rales or rhonchi, symmetric air entry  Heart - normal rate, regular rhythm, normal S1, S2, no murmurs, rubs, clicks or gallops      Assessment/ Plan:   Diagnoses and all orders for this visit:    1. Attention deficit disorder (ADD) without hyperactivity  -     dextroamphetamine-amphetamine (ADDERALL) 20 mg tablet; Take 1 Tablet by mouth two (2) times a day for 30 days.  Max Daily Amount: 40 mg.  - dextroamphetamine-amphetamine (ADDERALL) 20 mg tablet; Take 1 Tablet by mouth two (2) times a day for 30 days. Max Daily Amount: 40 mg.  -     dextroamphetamine-amphetamine (ADDERALL) 20 mg tablet; Take 1 Tablet by mouth two (2) times a day for 30 days. Max Daily Amount: 40 mg. Discussed possibly lowering the dose of the Adderall and he would like to stick with the 20 twice a day for now especially since we are adding on Zoloft. He is aware will take at least a few weeks for the Zoloft to start working. 2. Anxiety  -     sertraline (ZOLOFT) 25 mg tablet; Take 1 Tablet by mouth daily. He will follow-up in 4 to 6 weeks regarding the Zoloft to see how he is doing on it. Follow-up and Dispositions    · Return in about 6 weeks (around 8/23/2022), or if symptoms worsen or fail to improve. I have discussed the diagnosis with the patient and the intended plan as seen in the above orders. The patient has received an after-visit summary and questions were answered concerning future plans. Pt conveyed understanding of plan. Medication Side Effects and Warnings were discussed with patient      Joy Yeh is being evaluated by a Virtual Visit (video visit) encounter to address concerns as mentioned above. A caregiver was present when appropriate. Due to this being a TeleHealth encounter (During [place]-07 public health emergency), evaluation of the following organ systems was limited: Vitals/Constitutional/EENT/Resp/CV/GI//MS/Neuro/Skin/Heme-Lymph-Imm. Pursuant to the emergency declaration under the [address] [place] [address], [address] [place] [address] authority and the [employer] and [employer] Act, this Virtual Visit was conducted with patient's (and/or legal guardian's) consent, to reduce the patient's risk of exposure to COVID-19 and provide necessary medical care.   The patient (and/or legal guardian) has also been advised to contact this office for worsening conditions or problems, and seek emergency medical treatment and/or call 911 if deemed necessary. Patient identification was verified at the start of the visit: Yes    Services were provided through a video synchronous discussion virtually to substitute for in-person clinic visit. Patient and provider were located at their individual homes.   --Noé Wynn DO on 7/12/2022 at 3:47 PM

## 2022-08-21 DIAGNOSIS — F98.8 ATTENTION DEFICIT DISORDER (ADD) WITHOUT HYPERACTIVITY: ICD-10-CM

## 2022-08-21 DIAGNOSIS — F41.9 ANXIETY: ICD-10-CM

## 2022-08-23 DIAGNOSIS — F41.9 ANXIETY: ICD-10-CM

## 2022-08-23 RX ORDER — SERTRALINE HYDROCHLORIDE 25 MG/1
25 TABLET, FILM COATED ORAL DAILY
Qty: 30 TABLET | Refills: 2 | OUTPATIENT
Start: 2022-08-23

## 2022-08-23 RX ORDER — SERTRALINE HYDROCHLORIDE 25 MG/1
25 TABLET, FILM COATED ORAL DAILY
Qty: 30 TABLET | Refills: 2 | Status: SHIPPED | OUTPATIENT
Start: 2022-08-23 | End: 2022-10-27 | Stop reason: SDUPTHER

## 2022-08-23 RX ORDER — DEXTROAMPHETAMINE SACCHARATE, AMPHETAMINE ASPARTATE, DEXTROAMPHETAMINE SULFATE AND AMPHETAMINE SULFATE 5; 5; 5; 5 MG/1; MG/1; MG/1; MG/1
20 TABLET ORAL 2 TIMES DAILY
Qty: 60 TABLET | Refills: 0 | OUTPATIENT
Start: 2022-08-23 | End: 2022-09-22

## 2022-08-23 NOTE — TELEPHONE ENCOUNTER
Called and spoke with patient. Advised that 3 months of Adderall was given last month and to contact the pharmacy for refills. Patient verbalized understanding.

## 2022-08-23 NOTE — TELEPHONE ENCOUNTER
Paxil was discontinued and he was given 3 months of Adderall at his last appointment he should check with the pharmacy for refill of the Adderall.

## 2022-10-27 DIAGNOSIS — F41.9 ANXIETY: ICD-10-CM

## 2022-10-28 RX ORDER — SERTRALINE HYDROCHLORIDE 25 MG/1
25 TABLET, FILM COATED ORAL DAILY
Qty: 30 TABLET | Refills: 2 | Status: SHIPPED | OUTPATIENT
Start: 2022-10-28 | End: 2022-11-18

## 2022-11-18 ENCOUNTER — VIRTUAL VISIT (OUTPATIENT)
Dept: FAMILY MEDICINE CLINIC | Age: 27
End: 2022-11-18
Payer: COMMERCIAL

## 2022-11-18 DIAGNOSIS — F41.9 ANXIETY: ICD-10-CM

## 2022-11-18 DIAGNOSIS — F98.8 ATTENTION DEFICIT DISORDER (ADD) WITHOUT HYPERACTIVITY: ICD-10-CM

## 2022-11-18 PROCEDURE — 99214 OFFICE O/P EST MOD 30 MIN: CPT | Performed by: FAMILY MEDICINE

## 2022-11-18 RX ORDER — DEXTROAMPHETAMINE SACCHARATE, AMPHETAMINE ASPARTATE, DEXTROAMPHETAMINE SULFATE AND AMPHETAMINE SULFATE 5; 5; 5; 5 MG/1; MG/1; MG/1; MG/1
20 TABLET ORAL 2 TIMES DAILY
Qty: 60 TABLET | Refills: 0 | Status: SHIPPED | OUTPATIENT
Start: 2023-01-14 | End: 2023-02-13

## 2022-11-18 RX ORDER — DEXTROAMPHETAMINE SACCHARATE, AMPHETAMINE ASPARTATE, DEXTROAMPHETAMINE SULFATE AND AMPHETAMINE SULFATE 5; 5; 5; 5 MG/1; MG/1; MG/1; MG/1
20 TABLET ORAL 2 TIMES DAILY
Qty: 60 TABLET | Refills: 0 | Status: SHIPPED | OUTPATIENT
Start: 2022-12-16 | End: 2023-01-15

## 2022-11-18 RX ORDER — SERTRALINE HYDROCHLORIDE 50 MG/1
50 TABLET, FILM COATED ORAL DAILY
Qty: 90 TABLET | Refills: 1 | Status: SHIPPED | OUTPATIENT
Start: 2022-11-18

## 2022-11-18 RX ORDER — DEXTROAMPHETAMINE SACCHARATE, AMPHETAMINE ASPARTATE, DEXTROAMPHETAMINE SULFATE AND AMPHETAMINE SULFATE 5; 5; 5; 5 MG/1; MG/1; MG/1; MG/1
20 TABLET ORAL 2 TIMES DAILY
Qty: 60 TABLET | Refills: 0 | Status: SHIPPED | OUTPATIENT
Start: 2022-11-18 | End: 2022-12-18

## 2022-11-18 NOTE — PROGRESS NOTES
Progress Note    he is a 32y.o. year old male who presents for evalution. Subjective:     She is here for refill of Adderall 20 mg. Said working well for his ADD. No issues with side effects works decently Concerta did work better but had a higher out-of-pocket expense. He is also taking the sertraline 25 mg once daily which has been helping but he thinks he can work better and not working quite as well as it first was he would like to go up on the dose. Not having issues with side effects. Reviewed PmHx, RxHx, FmHx, SocHx, AllgHx and updated and dated in the chart. Review of Systems - negative except as listed above in the HPI    Objective: There were no vitals filed for this visit. Current Outpatient Medications   Medication Sig    sertraline (ZOLOFT) 50 mg tablet Take 1 Tablet by mouth daily. [START ON 1/14/2023] dextroamphetamine-amphetamine (ADDERALL) 20 mg tablet Take 1 Tablet by mouth two (2) times a day for 30 days. Max Daily Amount: 40 mg. [START ON 12/16/2022] dextroamphetamine-amphetamine (ADDERALL) 20 mg tablet Take 1 Tablet by mouth two (2) times a day for 30 days. Max Daily Amount: 40 mg.    dextroamphetamine-amphetamine (ADDERALL) 20 mg tablet Take 1 Tablet by mouth two (2) times a day for 30 days. Max Daily Amount: 40 mg. No current facility-administered medications for this visit. Physical Examination: General appearance - alert, well appearing, and in no distress  Mental status - alert, oriented to person, place, and time      Assessment/ Plan:   Diagnoses and all orders for this visit:    1. Anxiety  -     sertraline (ZOLOFT) 50 mg tablet; Take 1 Tablet by mouth daily. 2. Attention deficit disorder (ADD) without hyperactivity  -     dextroamphetamine-amphetamine (ADDERALL) 20 mg tablet; Take 1 Tablet by mouth two (2) times a day for 30 days. Max Daily Amount: 40 mg.  -     dextroamphetamine-amphetamine (ADDERALL) 20 mg tablet;  Take 1 Tablet by mouth two (2) times a day for 30 days. Max Daily Amount: 40 mg.  -     dextroamphetamine-amphetamine (ADDERALL) 20 mg tablet; Take 1 Tablet by mouth two (2) times a day for 30 days. Max Daily Amount: 40 mg. Follow-up and Dispositions    Return in about 3 months (around 2/18/2023), or if symptoms worsen or fail to improve. I have discussed the diagnosis with the patient and the intended plan as seen in the above orders. The patient has received an after-visit summary and questions were answered concerning future plans. Pt conveyed understanding of plan. Medication Side Effects and Warnings were discussed with patient      Jean Claude Bar is being evaluated by a Virtual Visit (video visit) encounter to address concerns as mentioned above. A caregiver was present when appropriate. Due to this being a TeleHealth encounter (During [place] [id number] public health emergency), evaluation of the following organ systems was limited: Vitals/Constitutional/EENT/Resp/CV/GI//MS/Neuro/Skin/Heme-Lymph-Imm. Pursuant to the emergency declaration under the [address] [place] [address], [address] [place] [address] authority and the [employer] and [employer] Act, this Virtual Visit was conducted with patient's (and/or legal guardian's) consent, to reduce the patient's risk of exposure to COVID-19 and provide necessary medical care. The patient (and/or legal guardian) has also been advised to contact this office for worsening conditions or problems, and seek emergency medical treatment and/or call 911 if deemed necessary. Patient identification was verified at the start of the visit: Yes    Services were provided through a video synchronous discussion virtually to substitute for in-person clinic visit. Patient and provider were located at their individual homes.   --Noris Villalta DO on 11/18/2022 at 3:06 PM

## 2022-11-18 NOTE — PATIENT INSTRUCTIONS

## 2022-12-14 DIAGNOSIS — F98.8 ATTENTION DEFICIT DISORDER (ADD) WITHOUT HYPERACTIVITY: ICD-10-CM

## 2022-12-15 RX ORDER — DEXTROAMPHETAMINE SACCHARATE, AMPHETAMINE ASPARTATE, DEXTROAMPHETAMINE SULFATE AND AMPHETAMINE SULFATE 5; 5; 5; 5 MG/1; MG/1; MG/1; MG/1
20 TABLET ORAL 2 TIMES DAILY
Qty: 60 TABLET | Refills: 0 | OUTPATIENT
Start: 2022-12-15 | End: 2023-01-14

## 2022-12-15 NOTE — TELEPHONE ENCOUNTER
Called and spoke with patient. Advised that when he was seen for his VV last month Dr. Bina Torres wrote for 3 months worth. Advised that he has one for this month that will be available for fill as of tomorrow. Patient verbalized understanding.